# Patient Record
Sex: FEMALE | Employment: FULL TIME | URBAN - METROPOLITAN AREA
[De-identification: names, ages, dates, MRNs, and addresses within clinical notes are randomized per-mention and may not be internally consistent; named-entity substitution may affect disease eponyms.]

---

## 2017-03-03 ENCOUNTER — TELEPHONE (OUTPATIENT)
Dept: FAMILY MEDICINE | Facility: CLINIC | Age: 22
End: 2017-03-03

## 2017-03-03 DIAGNOSIS — F41.9 ANXIETY: ICD-10-CM

## 2017-03-03 DIAGNOSIS — F33.1 MAJOR DEPRESSIVE DISORDER, RECURRENT EPISODE, MODERATE (H): ICD-10-CM

## 2017-03-03 RX ORDER — ESCITALOPRAM OXALATE 20 MG/1
20 TABLET ORAL DAILY
Qty: 30 TABLET | Refills: 0 | Status: SHIPPED | OUTPATIENT
Start: 2017-03-03 | End: 2017-03-05

## 2017-03-03 RX ORDER — ESCITALOPRAM OXALATE 10 MG/1
10 TABLET ORAL DAILY
Qty: 60 TABLET | Refills: 1 | Status: CANCELLED | OUTPATIENT
Start: 2017-03-03

## 2017-03-03 NOTE — TELEPHONE ENCOUNTER
Routing refill request to provider for review/approval because:  PHQ-9 over 4.    Directions need to be updated.    Sravanthi Andersen RN

## 2017-03-03 NOTE — TELEPHONE ENCOUNTER
Escitalopram  Last Written Prescription Date: 12/20/16  Last Fill Quantity: 60, # refills: 1  Last Office Visit with [facility] primary care provider:  12/20/16 Kay Alvarez PA-C, MPAS          Last PHQ-9 score on record=   PHQ-9 SCORE 12/20/2016   Total Score 23         BENSON Steen (R)

## 2017-03-03 NOTE — TELEPHONE ENCOUNTER
Spoke to patient.  Patient is only taking 10 mg per day.  I scheduled her a medication check for next Wednesday March 8th.  Matilda Copeland CMA  March 3, 2017 5:22 PM

## 2017-03-03 NOTE — TELEPHONE ENCOUNTER
Needs to be seen for any additional refills.  Given one month prescription of 20 mg daily.  Make sure that is how much she is taking and assist with scheduling follow up with me

## 2017-03-05 RX ORDER — ESCITALOPRAM OXALATE 10 MG/1
10 TABLET ORAL DAILY
Qty: 30 TABLET | Refills: 1 | Status: SHIPPED | OUTPATIENT
Start: 2017-03-05 | End: 2017-04-25

## 2017-03-05 NOTE — TELEPHONE ENCOUNTER
Prescription for 10 mg sent to pharmacy.  Please call pharmacy and cancel prescription for 20 mg daily

## 2017-04-25 ENCOUNTER — OFFICE VISIT (OUTPATIENT)
Dept: FAMILY MEDICINE | Facility: CLINIC | Age: 22
End: 2017-04-25
Payer: COMMERCIAL

## 2017-04-25 VITALS
DIASTOLIC BLOOD PRESSURE: 80 MMHG | WEIGHT: 187 LBS | SYSTOLIC BLOOD PRESSURE: 130 MMHG | OXYGEN SATURATION: 100 % | HEIGHT: 66 IN | BODY MASS INDEX: 30.05 KG/M2 | HEART RATE: 75 BPM | RESPIRATION RATE: 12 BRPM | TEMPERATURE: 98.1 F

## 2017-04-25 DIAGNOSIS — S46.819D STRAIN OF TRAPEZIUS MUSCLE, UNSPECIFIED LATERALITY, SUBSEQUENT ENCOUNTER: Primary | ICD-10-CM

## 2017-04-25 DIAGNOSIS — F41.9 ANXIETY: ICD-10-CM

## 2017-04-25 DIAGNOSIS — F33.1 MAJOR DEPRESSIVE DISORDER, RECURRENT EPISODE, MODERATE (H): ICD-10-CM

## 2017-04-25 PROCEDURE — 99214 OFFICE O/P EST MOD 30 MIN: CPT | Performed by: PHYSICIAN ASSISTANT

## 2017-04-25 RX ORDER — ESCITALOPRAM OXALATE 10 MG/1
10 TABLET ORAL DAILY
Qty: 90 TABLET | Refills: 1 | Status: SHIPPED | OUTPATIENT
Start: 2017-04-25 | End: 2018-11-29

## 2017-04-25 RX ORDER — MELOXICAM 15 MG/1
15 TABLET ORAL DAILY
Qty: 30 TABLET | Refills: 1 | Status: SHIPPED | OUTPATIENT
Start: 2017-04-25

## 2017-04-25 RX ORDER — METHOCARBAMOL 500 MG/1
1000 TABLET, FILM COATED ORAL 3 TIMES DAILY PRN
Qty: 30 TABLET | Refills: 1 | Status: SHIPPED | OUTPATIENT
Start: 2017-04-25

## 2017-04-25 ASSESSMENT — ANXIETY QUESTIONNAIRES
5. BEING SO RESTLESS THAT IT IS HARD TO SIT STILL: SEVERAL DAYS
IF YOU CHECKED OFF ANY PROBLEMS ON THIS QUESTIONNAIRE, HOW DIFFICULT HAVE THESE PROBLEMS MADE IT FOR YOU TO DO YOUR WORK, TAKE CARE OF THINGS AT HOME, OR GET ALONG WITH OTHER PEOPLE: SOMEWHAT DIFFICULT
6. BECOMING EASILY ANNOYED OR IRRITABLE: SEVERAL DAYS
GAD7 TOTAL SCORE: 6
2. NOT BEING ABLE TO STOP OR CONTROL WORRYING: SEVERAL DAYS
1. FEELING NERVOUS, ANXIOUS, OR ON EDGE: SEVERAL DAYS
7. FEELING AFRAID AS IF SOMETHING AWFUL MIGHT HAPPEN: NOT AT ALL
3. WORRYING TOO MUCH ABOUT DIFFERENT THINGS: SEVERAL DAYS

## 2017-04-25 ASSESSMENT — PATIENT HEALTH QUESTIONNAIRE - PHQ9: 5. POOR APPETITE OR OVEREATING: SEVERAL DAYS

## 2017-04-25 NOTE — PATIENT INSTRUCTIONS
Schedule physical therapy with [facility] ([phone]).  They have several locations around the [facility].     Start robaxin 1000mg three times a day as needed for pain.   If makes sleepy do not drive.   Discontinue ibuprofen.  Start meloxicam daily   Return urgently if any change in symptoms.    Follow up with us if pain not improving over the next 2-4 weeks.

## 2017-04-25 NOTE — NURSING NOTE
"Chief Complaint   Patient presents with     Refill Request       Initial /80 (BP Location: Right arm, Patient Position: Chair, Cuff Size: Adult Regular)  Pulse 75  Temp 98.1  F (36.7  C) (Oral)  Resp 12  Ht 1.664 m (5' 5.5\")  Wt 84.8 kg (187 lb)  SpO2 100%  BMI 30.65 kg/m2 Estimated body mass index is 30.65 kg/(m^2) as calculated from the following:    Height as of this encounter: 1.664 m (5' 5.5\").    Weight as of this encounter: 84.8 kg (187 lb).  Medication Reconciliation: abbi López        "

## 2017-04-25 NOTE — PROGRESS NOTES
SUBJECTIVE:                                                    Sarah Beth Morales is a 21 year old female who presents to clinic today for the following health issues:          Depression and Anxiety Follow-Up    Status since last visit: No change    Other associated symptoms:None    Complicating factors:     Significant life event: No     Current substance abuse: None    PHQ-9 SCORE 12/20/2016 4/25/2017   Total Score 23 9     MIREYA-7 SCORE 12/20/2016 4/25/2017   Total Score 16 6        PHQ-9  English      PHQ-9   Any Language     GAD7       Problem list and histories reviewed & adjusted, as indicated.  Additional history: as documented    Has been seeing chiropractor for shoulder and back pain.  ( at [facility])   3 visits with neck adjustments.  No numbness or tingling.  Neck pain does progress into headache.  Taking ibuprofen three 200mg tablets approximately once  A day without improvement in symptoms.    Rates neck pain approximately 7/10.  Almost intolerable     Also complains of pain in thumbs.  Feel like on fire while holding cell phone or massaging mom's wrists. Dropped cosmetology school due to pain.  Taking on line CMA courses    lexapro seems to help depression and anxiety. Only taking 10 mg.  Didn't like way she felt with 20mg.  Felt jittery.      Sleep depends on the night.  Doesn't sleep hard.      Patient Active Problem List   Diagnosis     Allergic rhinitis     Presence of subdermal contraceptive device     Major depressive disorder, recurrent episode, moderate (H)     Anxiety     Elevated blood pressure reading without diagnosis of hypertension     Non morbid obesity due to excess calories     Past Surgical History:   Procedure Laterality Date     DENTAL SURGERY         Social History   Substance Use Topics     Smoking status: Never Smoker     Smokeless tobacco: Never Used     Alcohol use No     Family History   Problem Relation Age of Onset     DIABETES Mother      Psychotic  "Disorder Mother      depression     DIABETES Father      HEART DISEASE Maternal Grandfather      Lipids Maternal Grandfather      Hypertension Maternal Grandfather      Psychotic Disorder Maternal Grandfather      depression         Current Outpatient Prescriptions   Medication Sig Dispense Refill     methocarbamol (ROBAXIN) 500 MG tablet Take 2 tablets (1,000 mg) by mouth 3 times daily as needed for muscle spasms 30 tablet 1     escitalopram (LEXAPRO) 10 MG tablet Take 1 tablet (10 mg) by mouth daily 90 tablet 1     meloxicam (MOBIC) 15 MG tablet Take 1 tablet (15 mg) by mouth daily 30 tablet 1     etonogestrel (IMPLANON/NEXPLANON) 68 MG IMPL 1 each (68 mg) by Subdermal route once for 1 dose 1 each 0       Reviewed and updated as needed this visit by clinical staff  Tobacco  Allergies  Meds  Med Hx  Surg Hx  Fam Hx  Soc Hx      Reviewed and updated as needed this visit by Provider         ROS:  Constitutional, HEENT, cardiovascular, pulmonary, gi and gu systems are negative, except as otherwise noted.    OBJECTIVE:                                                    /80 (BP Location: Right arm, Patient Position: Chair, Cuff Size: Adult Regular)  Pulse 75  Temp 98.1  F (36.7  C) (Oral)  Resp 12  Ht 1.664 m (5' 5.5\")  Wt 84.8 kg (187 lb)  SpO2 100%  BMI 30.65 kg/m2  Body mass index is 30.65 kg/(m^2).  GENERAL: healthy, alert and no distress  NECK: no adenopathy, no asymmetry, masses, or scars and thyroid normal to palpation  RESP: lungs clear to auscultation - no rales, rhonchi or wheezes  CV: regular rate and rhythm, normal S1 S2, no S3 or S4, no murmur, click or rub, no peripheral edema and peripheral pulses strong  ABDOMEN: soft, nontender, no hepatosplenomegaly, no masses and bowel sounds normal  MS: limited range of motion of neck due to pain- some loss of range of motion with right rotation  Tender trapezius muscles. No tenderness of cervical spine  Normal sensation distal upper extremities.  " Strength +5/5 hand  biceps and triceps reflexes normal.     Diagnostic Test Results:  none      ASSESSMENT/PLAN:                                                            1. Strain of trapezius muscle, unspecified laterality, subsequent encounter  Follow up with physical therapy and trial of robaxin and mobic.  Advised not to take any ibuprofen  - methocarbamol (ROBAXIN) 500 MG tablet; Take 2 tablets (1,000 mg) by mouth 3 times daily as needed for muscle spasms  Dispense: 30 tablet; Refill: 1  - [facility] PT, HAND, AND CHIROPRACTIC REFERRAL  - meloxicam (MOBIC) 15 MG tablet; Take 1 tablet (15 mg) by mouth daily  Dispense: 30 tablet; Refill: 1    2. Major depressive disorder, recurrent episode, moderate (H)  Continue lexapro-declines dose adjustment at this time even though still some symptoms   - escitalopram (LEXAPRO) 10 MG tablet; Take 1 tablet (10 mg) by mouth daily  Dispense: 90 tablet; Refill: 1    3. Anxiety  As above   - escitalopram (LEXAPRO) 10 MG tablet; Take 1 tablet (10 mg) by mouth daily  Dispense: 90 tablet; Refill: 1    Patient Instructions   Schedule physical therapy with [facility] ([phone]).  They have several locations around the [facility].     Start robaxin 1000mg three times a day as needed for pain.   If makes sleepy do not drive.   Discontinue ibuprofen.  Start meloxicam daily   Return urgently if any change in symptoms.    Follow up with us if pain not improving over the next 2-4 weeks.       Kay Alvarez PA-C  [facility]

## 2017-04-25 NOTE — MR AVS SNAPSHOT
After Visit Summary   4/25/2017    Sarah Beth Morales    MRN: [id number]           Patient Information     Date Of Birth          1995        Visit Information        Provider Department      4/25/2017 2:20 PM Kay Alvarez PA-C [facility]        Today's Diagnoses     Strain of trapezius muscle, unspecified laterality, subsequent encounter    -  1    Major depressive disorder, recurrent episode, moderate (H)        Anxiety          Care Instructions    Schedule physical therapy with [facility] ([phone]).  They have several locations around the [facility].     Start robaxin 1000mg three times a day as needed for pain.   If makes sleepy do not drive.   Discontinue ibuprofen.  Start meloxicam daily   Return urgently if any change in symptoms.    Follow up with us if pain not improving over the next 2-4 weeks.        Follow-ups after your visit        Additional Services     [facility] PT, HAND, AND CHIROPRACTIC REFERRAL       === This order will print in the [facility] Scheduling  Office ===    Physical therapy, hand therapy and chiropractic care are available through:    [place] [facility]    Call one easy number to schedule at any of the above locations:  [phone].    Your provider has referred you to Physical Therapy at [facility] or [facility]    Indication/Reason for Referral: neck pain and upper back pain   Onset of Illness:  1 month   Therapy Orders:  Evaluate and Treat  Special Programs:  None  Special Request:  None    Additional Comments for the therapist or chiropractor:      Please be aware that coverage of these services is subject to the terms and limitations of your health insurance plan.  Call member services at your health plan with any benefit or coverage questions.      Please bring the following to your appointment:    >>   Your personal calendar for scheduling future appointments  >>    "Comfortable clothing                  Who to contact     If you have questions or need follow up information about today's clinic visit or your schedule please contact [facility] directly at [phone].  Normal or non-critical lab and imaging results will be communicated to you by MyChart, letter or phone within 4 business days after the clinic has received the results. If you do not hear from us within 7 days, please contact the clinic through MyChart or phone. If you have a critical or abnormal lab result, we will notify you by phone as soon as possible.  Submit refill requests through [employer] or call your pharmacy and they will forward the refill request to us. Please allow 3 business days for your refill to be completed.          Additional Information About Your Visit        [employer] Information     [employer] gives you secure access to your electronic health record. If you see a primary care provider, you can also send messages to your care team and make appointments. If you have questions, please call your primary care clinic.  If you do not have a primary care provider, please call [phone] and they will assist you.        Care EveryWhere ID     This is your Care EveryWhere ID. This could be used by other organizations to access your [place] medical records  [id number]        Your Vitals Were     Pulse Temperature Respirations Height Pulse Oximetry BMI (Body Mass Index)    75 98.1  F (36.7  C) (Oral) 12 1.664 m (5' 5.5\") 100% 30.65 kg/m2       Blood Pressure from Last 3 Encounters:   04/25/17 130/80   12/20/16 (!) 120/100   04/15/15 110/80    Weight from Last 3 Encounters:   04/25/17 84.8 kg (187 lb)   12/20/16 85.2 kg (187 lb 14.4 oz)   04/15/15 70.8 kg (156 lb 1.6 oz) (87 %)*     * Growth percentiles are based on CDC 2-20 Years data.              We Performed the Following     [facility] PT, HAND, AND CHIROPRACTIC REFERRAL          Today's Medication Changes          These changes are " accurate as of: 4/25/17  2:48 PM.  If you have any questions, ask your nurse or doctor.               Start taking these medicines.        Dose/Directions    meloxicam 15 MG tablet   Commonly known as:  MOBIC   Used for:  Strain of trapezius muscle, unspecified laterality, subsequent encounter   Started by:  Kay Alvarez PA-C        Dose:  15 mg   Take 1 tablet (15 mg) by mouth daily   Quantity:  30 tablet   Refills:  1       methocarbamol 500 MG tablet   Commonly known as:  ROBAXIN   Used for:  Strain of trapezius muscle, unspecified laterality, subsequent encounter   Started by:  Kay Alvarez PA-C        Dose:  1000 mg   Take 2 tablets (1,000 mg) by mouth 3 times daily as needed for muscle spasms   Quantity:  30 tablet   Refills:  1         These medicines have changed or have updated prescriptions.        Dose/Directions    escitalopram 10 MG tablet   Commonly known as:  LEXAPRO   This may have changed:  additional instructions   Used for:  Major depressive disorder, recurrent episode, moderate (H), Anxiety   Changed by:  Kay Alvarez PA-C        Dose:  10 mg   Take 1 tablet (10 mg) by mouth daily   Quantity:  90 tablet   Refills:  1            Where to get your medicines      These medications were sent to [id number] [place], [address] AT [address] [place] [address]     Phone:  [phone]     escitalopram 10 MG tablet    meloxicam 15 MG tablet    methocarbamol 500 MG tablet                Primary Care Provider Office Phone # Fax #    Kay Alvarez PA-C [phone]       [address]  [facility] [address]        Thank you!     Thank you for choosing [facility]  for your care. Our goal is always to provide you with excellent care. Hearing back from our patients is one way we can continue to improve our services. Please take a few minutes to  complete the written survey that you may receive in the mail after your visit with us. Thank you!             Your Updated Medication List - Protect others around you: Learn how to safely use, store and throw away your medicines at www.disposemymeds.org.          This list is accurate as of: 4/25/17  2:48 PM.  Always use your most recent med list.                   Brand Name Dispense Instructions for use    escitalopram 10 MG tablet    LEXAPRO    90 tablet    Take 1 tablet (10 mg) by mouth daily       etonogestrel 68 MG Impl    IMPLANON/NEXPLANON    1 each    1 each (68 mg) by Subdermal route once for 1 dose       meloxicam 15 MG tablet    MOBIC    30 tablet    Take 1 tablet (15 mg) by mouth daily       methocarbamol 500 MG tablet    ROBAXIN    30 tablet    Take 2 tablets (1,000 mg) by mouth 3 times daily as needed for muscle spasms

## 2017-04-26 ASSESSMENT — PATIENT HEALTH QUESTIONNAIRE - PHQ9: SUM OF ALL RESPONSES TO PHQ QUESTIONS 1-9: 9

## 2017-04-26 ASSESSMENT — ANXIETY QUESTIONNAIRES: GAD7 TOTAL SCORE: 6

## 2017-05-24 ENCOUNTER — THERAPY VISIT (OUTPATIENT)
Dept: PHYSICAL THERAPY | Facility: CLINIC | Age: 22
End: 2017-05-24
Payer: COMMERCIAL

## 2017-05-24 DIAGNOSIS — M54.2 NECK PAIN: Primary | ICD-10-CM

## 2017-05-24 PROCEDURE — 97161 PT EVAL LOW COMPLEX 20 MIN: CPT | Mod: GP | Performed by: PHYSICAL THERAPIST

## 2017-05-24 PROCEDURE — 97110 THERAPEUTIC EXERCISES: CPT | Mod: GP | Performed by: PHYSICAL THERAPIST

## 2017-05-24 PROCEDURE — 97140 MANUAL THERAPY 1/> REGIONS: CPT | Mod: GP | Performed by: PHYSICAL THERAPIST

## 2017-05-24 NOTE — Clinical Note
Please see the evaluation report completed in PT today.  Thank you for referring Sarah Beth to us! Cordially,  STEPHEN Quiroz, MPT

## 2017-05-24 NOTE — PROGRESS NOTES
[facility] Initial Evaluation      Subjective:    Patient is a 21 year old female presenting with rehab cervical spine hpi. The history is provided by the patient.   Sarah Beth Morales is a 21 year old female with a cervical spine and thoracic spine condition.  Condition occurred with:  Insidious onset.  Condition occurred: at home.  This is a new condition  Pt's neck and upper back started about a yr ago; it started when she started sleeping w/a foster brother who needed to sleep with her, which caused her to sleep w/her head tilted. She is trying to wean him from sleeping with her, but it is difficult. She has consulted w/her PCP and the chiropractor, PCP on 4/25/17.  Her last chiropractic treatment was on 5/19/17..    Patient reports pain:  Cervical right side and cervical left side (B upper traps).  Radiates to:  Head.  Pain is described as aching and is constant and reported as 7/10 (10/10 at worst).  Associated symptoms:  Headache, visual disturbances, dizziness and loss of motion/stiffness (nauseous). Pain is the same all the time.  Symptoms are exacerbated by carrying, lifting, looking up or down and certain positions (lifting >50#; gets nauseous w/looking down; stress) and relieved by ice, other, NSAID's and rest (antidepressants).      Previous treatment includes chiropractic (but not anymore for her neck, just her shoulders and LB).  There was no (adjustments to her neck made her dizzy and nauseous) improvement following previous treatment.  General health as reported by patient is good.  Pertinent medical history includes:  Depression and migraines.  Medical allergies: yes (listed in Epic, pt also notes possibility to latex).  Other surgeries include:  No.  Current medications:  Anti-inflammatory, anti-depressants and muscle relaxants.  Current occupation is PCA; student (online).  Patient is working in normal job without restrictions.  Primary job tasks include:  Lifting and repetitive  "tasks (2-3 hrs on the computer per day for class work).    Barriers include:  None as reported by the patient.    Red flags:  None as reported by the patient.                        Objective:    Standing Alignment:    Cervical/Thoracic:  Forward head (moderate to significant)  Shoulder/UE:  Rounded shoulders (moderate)                                  Cervical/Thoracic Evaluation    AROM:  AROM Cervical:    Flexion:            100% of NL  Extension:       Full  Rotation:         Left: full w/\"hot\" sensation     Right: full, \"hot\"  Side Bend:      Left: full, pain     Right:  Full    Strength: weak scapular, upper back mm as demonstrated w/pt's quick return to poor posture after correcting it  Headaches: none  Cervical Myotomes:  not assessed                  DTR's:  not assessed            Cervical Palpation:    Tenderness present at Left:    Scalenes; Upper Trap; Levator and Suboccipitals  Tenderness not present at Left:   Erector Spinae  Tenderness present at Right:    Scalenes; Upper Trap; Levator; Erector Spinae and Suboccipitals      Spinal Segmental Conclusions:  NL vertebral joint mobility at all cervical levels, no pain with either anterior or side glides.                                                  General     ROS    Assessment/Plan:      Patient is a 21 year old female with cervical complaints.    Patient has the following significant findings with corresponding treatment plan.                Diagnosis 1:  B upper trap strain  Pain -  hot/cold therapy, US, electric stimulation, self management, education and home program  Decreased ROM/flexibility - manual therapy and therapeutic exercise  Decreased strength - therapeutic exercise and therapeutic activities  Impaired muscle performance - neuro re-education  Decreased function - therapeutic activities  Impaired posture - neuro re-education and therapeutic activities    Therapy Evaluation Codes:   1) History comprised of:   Personal factors that impact " the plan of care:      Past/current experiences.    Comorbidity factors that impact the plan of care are:      Dizziness.     Medications impacting care: Muscle relaxant.  2) Examination of Body Systems comprised of:   Body structures and functions that impact the plan of care:      Cervical spine.   Activity limitations that impact the plan of care are:      Lifting, Reading/Computer work and Sleeping.  3) Clinical presentation characteristics are:   Stable/Uncomplicated.  4) Decision-Making    Low complexity using standardized patient assessment instrument and/or measureable assessment of functional outcome.  Cumulative Therapy Evaluation is: Low complexity.    Previous and current functional limitations:  (See Goal Flow Sheet for this information)    Short term and Long term goals: (See Goal Flow Sheet for this information)     Communication ability:  Patient appears to be able to clearly communicate and understand verbal and written communication and follow directions correctly.  Treatment Explanation - The following has been discussed with the patient:   RX ordered/plan of care  Anticipated outcomes  Possible risks and side effects  This patient would benefit from PT intervention to resume normal activities.   Rehab potential is good.    Frequency:  1 X week, once daily  Duration:  for 6 weeks  Discharge Plan:  Achieve all LTG.  Independent in home treatment program.  Reach maximal therapeutic benefit.    Please refer to the daily flowsheet for treatment today, total treatment time and time spent performing 1:1 timed codes.

## 2017-05-24 NOTE — MR AVS SNAPSHOT
After Visit Summary   5/24/2017    Sarah Beth Morales    MRN: [id number]           Patient Information     Date Of Birth          1995        Visit Information        Provider Department      5/24/2017 2:30 PM Sharmaine Quiroz PT [facility]        Today's Diagnoses     Neck pain    -  1       Follow-ups after your visit        Your next 10 appointments already scheduled     May 31, 2017  2:30 PM CDT   [facility] Spine with Sharmaine Quiroz PT   [facility] ([facility] [place]  )    [address]  [facility] [address]   [phone]              Who to contact     If you have questions or need follow up information about today's clinic visit or your schedule please contact [facility] directly at [phone].  Normal or non-critical lab and imaging results will be communicated to you by MyChart, letter or phone within 4 business days after the clinic has received the results. If you do not hear from us within 7 days, please contact the clinic through [employer] or phone. If you have a critical or abnormal lab result, we will notify you by phone as soon as possible.  Submit refill requests through [employer] or call your pharmacy and they will forward the refill request to us. Please allow 3 business days for your refill to be completed.          Additional Information About Your Visit        MyChart Information     [employer] gives you secure access to your electronic health record. If you see a primary care provider, you can also send messages to your care team and make appointments. If you have questions, please call your primary care clinic.  If you do not have a primary care provider, please call [phone] and they will assist you.        Care EveryWhere ID     This is your Care EveryWhere ID. This could be used by other organizations to access your [place] medical records  [id number]          Blood Pressure from Last 3 Encounters:   04/25/17 130/80   12/20/16 (!) 120/100   04/15/15 110/80    Weight from Last 3 Encounters:   04/25/17 84.8 kg (187 lb)   12/20/16 85.2 kg (187 lb 14.4 oz)   04/15/15 70.8 kg (156 lb 1.6 oz) (87 %)*     * Growth percentiles are based on [facility] 2-20 Years data.              We Performed the Following     HC PT EVAL, LOW COMPLEXITY     [facility] INITIAL EVAL REPORT     MANUAL THER TECH,1+REGIONS,EA 15 MIN     THERAPEUTIC EXERCISES        Primary Care Provider Office Phone # Fax #    Kay Alvarez PA-C [phone]       [address] [address]        Thank you!     Thank you for choosing [place] [facility]  for your care. Our goal is always to provide you with excellent care. Hearing back from our patients is one way we can continue to improve our services. Please take a few minutes to complete the written survey that you may receive in the mail after your visit with us. Thank you!             Your Updated Medication List - Protect others around you: Learn how to safely use, store and throw away your medicines at www.disposemymeds.org.          This list is accurate as of: 5/24/17  3:34 PM.  Always use your most recent med list.                   Brand Name Dispense Instructions for use    escitalopram 10 MG tablet    LEXAPRO    90 tablet    Take 1 tablet (10 mg) by mouth daily       etonogestrel 68 MG Impl    IMPLANON/NEXPLANON    1 each    1 each (68 mg) by Subdermal route once for 1 dose       meloxicam 15 MG tablet    MOBIC    30 tablet    Take 1 tablet (15 mg) by mouth daily       methocarbamol 500 MG tablet    ROBAXIN    30 tablet    Take 2 tablets (1,000 mg) by mouth 3 times daily as needed for muscle spasms

## 2017-07-11 ENCOUNTER — OFFICE VISIT (OUTPATIENT)
Dept: FAMILY MEDICINE | Facility: CLINIC | Age: 22
End: 2017-07-11
Payer: COMMERCIAL

## 2017-07-11 VITALS
BODY MASS INDEX: 29.73 KG/M2 | RESPIRATION RATE: 16 BRPM | TEMPERATURE: 98.4 F | HEART RATE: 68 BPM | WEIGHT: 185 LBS | DIASTOLIC BLOOD PRESSURE: 74 MMHG | SYSTOLIC BLOOD PRESSURE: 108 MMHG | OXYGEN SATURATION: 98 % | HEIGHT: 66 IN

## 2017-07-11 DIAGNOSIS — B37.31 CANDIDIASIS OF VULVA AND VAGINA: Primary | ICD-10-CM

## 2017-07-11 DIAGNOSIS — N89.8 VAGINAL DISCHARGE: ICD-10-CM

## 2017-07-11 DIAGNOSIS — Z23 NEED FOR HPV VACCINATION: ICD-10-CM

## 2017-07-11 DIAGNOSIS — R30.0 DYSURIA: ICD-10-CM

## 2017-07-11 LAB
ALBUMIN UR-MCNC: NEGATIVE MG/DL
APPEARANCE UR: CLEAR
BACTERIA #/AREA URNS HPF: ABNORMAL /HPF
BILIRUB UR QL STRIP: NEGATIVE
COLOR UR AUTO: YELLOW
GLUCOSE UR STRIP-MCNC: NEGATIVE MG/DL
HGB UR QL STRIP: ABNORMAL
KETONES UR STRIP-MCNC: NEGATIVE MG/DL
LEUKOCYTE ESTERASE UR QL STRIP: NEGATIVE
MICRO REPORT STATUS: NORMAL
NITRATE UR QL: NEGATIVE
PH UR STRIP: 6.5 PH (ref 5–7)
RBC #/AREA URNS AUTO: ABNORMAL /HPF (ref 0–2)
SP GR UR STRIP: 1.01 (ref 1–1.03)
SPECIMEN SOURCE: NORMAL
URN SPEC COLLECT METH UR: ABNORMAL
UROBILINOGEN UR STRIP-ACNC: 1 EU/DL (ref 0.2–1)
WBC #/AREA URNS AUTO: ABNORMAL /HPF (ref 0–2)
WET PREP SPEC: NORMAL

## 2017-07-11 PROCEDURE — 87210 SMEAR WET MOUNT SALINE/INK: CPT | Performed by: PHYSICIAN ASSISTANT

## 2017-07-11 PROCEDURE — 90651 9VHPV VACCINE 2/3 DOSE IM: CPT | Performed by: PHYSICIAN ASSISTANT

## 2017-07-11 PROCEDURE — 90471 IMMUNIZATION ADMIN: CPT | Performed by: PHYSICIAN ASSISTANT

## 2017-07-11 PROCEDURE — 87491 CHLMYD TRACH DNA AMP PROBE: CPT | Performed by: PHYSICIAN ASSISTANT

## 2017-07-11 PROCEDURE — 87591 N.GONORRHOEAE DNA AMP PROB: CPT | Performed by: PHYSICIAN ASSISTANT

## 2017-07-11 PROCEDURE — 81001 URINALYSIS AUTO W/SCOPE: CPT | Performed by: PHYSICIAN ASSISTANT

## 2017-07-11 PROCEDURE — 99214 OFFICE O/P EST MOD 30 MIN: CPT | Mod: 25 | Performed by: PHYSICIAN ASSISTANT

## 2017-07-11 RX ORDER — FLUCONAZOLE 150 MG/1
150 TABLET ORAL ONCE
Qty: 1 TABLET | Refills: 2 | Status: SHIPPED | OUTPATIENT
Start: 2017-07-11 | End: 2017-07-11

## 2017-07-11 ASSESSMENT — ANXIETY QUESTIONNAIRES
GAD7 TOTAL SCORE: 4
1. FEELING NERVOUS, ANXIOUS, OR ON EDGE: NOT AT ALL
7. FEELING AFRAID AS IF SOMETHING AWFUL MIGHT HAPPEN: NOT AT ALL
2. NOT BEING ABLE TO STOP OR CONTROL WORRYING: SEVERAL DAYS
5. BEING SO RESTLESS THAT IT IS HARD TO SIT STILL: NOT AT ALL
6. BECOMING EASILY ANNOYED OR IRRITABLE: MORE THAN HALF THE DAYS
3. WORRYING TOO MUCH ABOUT DIFFERENT THINGS: SEVERAL DAYS
IF YOU CHECKED OFF ANY PROBLEMS ON THIS QUESTIONNAIRE, HOW DIFFICULT HAVE THESE PROBLEMS MADE IT FOR YOU TO DO YOUR WORK, TAKE CARE OF THINGS AT HOME, OR GET ALONG WITH OTHER PEOPLE: SOMEWHAT DIFFICULT

## 2017-07-11 ASSESSMENT — PAIN SCALES - GENERAL: PAINLEVEL: NO PAIN (0)

## 2017-07-11 ASSESSMENT — PATIENT HEALTH QUESTIONNAIRE - PHQ9: 5. POOR APPETITE OR OVEREATING: NOT AT ALL

## 2017-07-11 NOTE — MR AVS SNAPSHOT
After Visit Summary   7/11/2017    Sarah Beth Morales    MRN: [id number]           Patient Information     Date Of Birth          1995        Visit Information        Provider Department      7/11/2017 1:20 PM Kay Alvarez PA-C [facility]        Today's Diagnoses     Vaginal discharge    -  1    Dysuria        Need for HPV vaccination        Candidiasis of vulva and vagina          Care Instructions    Take diflucan 150 mg for one dose.  Refills have been sent to pharmacy  Return urgently if any change in symptoms like increasing pain, increasing discharge, abdominal pain, nausea or vomiting or other change in symptoms.           Follow-ups after your visit        Who to contact     If you have questions or need follow up information about today's clinic visit or your schedule please contact [facility] directly at [phone].  Normal or non-critical lab and imaging results will be communicated to you by MyChart, letter or phone within 4 business days after the clinic has received the results. If you do not hear from us within 7 days, please contact the clinic through MyChart or phone. If you have a critical or abnormal lab result, we will notify you by phone as soon as possible.  Submit refill requests through [employer] or call your pharmacy and they will forward the refill request to us. Please allow 3 business days for your refill to be completed.          Additional Information About Your Visit        MyChart Information     [employer] gives you secure access to your electronic health record. If you see a primary care provider, you can also send messages to your care team and make appointments. If you have questions, please call your primary care clinic.  If you do not have a primary care provider, please call [phone] and they will assist you.        Care EveryWhere ID     This is your Care EveryWhere ID. This could be used by other organizations to access  "your [facility] records  [id number]        Your Vitals Were     Pulse Temperature Respirations Height Pulse Oximetry Breastfeeding?    68 98.4  F (36.9  C) (Oral) 16 1.664 m (5' 5.5\") 98% No    BMI (Body Mass Index)                   30.32 kg/m2            Blood Pressure from Last 3 Encounters:   07/11/17 108/74   04/25/17 130/80   12/20/16 (!) 120/100    Weight from Last 3 Encounters:   07/11/17 83.9 kg (185 lb)   04/25/17 84.8 kg (187 lb)   12/20/16 85.2 kg (187 lb 14.4 oz)              We Performed the Following     *UA reflex to Microscopic     CHLAMYDIA TRACHOMATIS PCR     DEPRESSION ACTION PLAN (DAP)     HUMAN PAPILLOMAVIRUS VACCINE     NEISSERIA GONORRHOEA PCR     Urine Microscopic     Wet prep          Today's Medication Changes          These changes are accurate as of: 7/11/17  2:04 PM.  If you have any questions, ask your nurse or doctor.               Start taking these medicines.        Dose/Directions    fluconazole 150 MG tablet   Commonly known as:  DIFLUCAN   Used for:  Candidiasis of vulva and vagina   Started by:  Kay Alvarez PA-C        Dose:  150 mg   Take 1 tablet (150 mg) by mouth once for 1 dose   Quantity:  1 tablet   Refills:  2            Where to get your medicines      These medications were sent to [facility] [id number] [place] [address] AT [address], [facility] [address]     Phone:  [phone]     fluconazole 150 MG tablet                Primary Care Provider Office Phone # Fax #    Kay Alvarez PA-C [phone]       [facility] [address]  [facility] [address]        Equal Access to Services     [facility]: Hadii aad ku hadasho Soomaali, waaxda luqadaha, qaybta kaalmada adeegyada, miley reidn malou colón. So [facility] [phone].    ATENCIÓN: Si habla español, tiene a langley disposición servicios gratuitos de asistencia lingüística. Llame al " [phone].    We comply with applicable federal civil rights laws and [place] laws. We do not discriminate on the basis of race, color, national origin, age, disability sex, sexual orientation or gender identity.            Thank you!     Thank you for choosing [facility]  for your care. Our goal is always to provide you with excellent care. Hearing back from our patients is one way we can continue to improve our services. Please take a few minutes to complete the written survey that you may receive in the mail after your visit with us. Thank you!             Your Updated Medication List - Protect others around you: Learn how to safely use, store and throw away your medicines at www.disposemymeds.org.          This list is accurate as of: 7/11/17  2:04 PM.  Always use your most recent med list.                   Brand Name Dispense Instructions for use Diagnosis    escitalopram 10 MG tablet    LEXAPRO    90 tablet    Take 1 tablet (10 mg) by mouth daily    Major depressive disorder, recurrent episode, moderate (H), Anxiety       etonogestrel 68 MG Impl    IMPLANON/NEXPLANON    1 each    1 each (68 mg) by Subdermal route once for 1 dose    Presence of subdermal contraceptive device, Insertion of implantable subdermal contraceptive       fluconazole 150 MG tablet    DIFLUCAN    1 tablet    Take 1 tablet (150 mg) by mouth once for 1 dose    Candidiasis of vulva and vagina       meloxicam 15 MG tablet    MOBIC    30 tablet    Take 1 tablet (15 mg) by mouth daily    Strain of trapezius muscle, unspecified laterality, subsequent encounter       methocarbamol 500 MG tablet    ROBAXIN    30 tablet    Take 2 tablets (1,000 mg) by mouth 3 times daily as needed for muscle spasms    Strain of trapezius muscle, unspecified laterality, subsequent encounter

## 2017-07-11 NOTE — PROGRESS NOTES
"  SUBJECTIVE:                                                    Sarah Beth Morales is a 21 year old female who presents to clinic today for the following health issues:    Vaginal Symptoms  Onset: 1 1/2 weeks    Description:  Vaginal Discharge: spotting   Itching (Pruritis): YES  Burning sensation:  YES  Odor: YES    Accompanying Signs & Symptoms:  Pain with Urination: occasional   Abdominal Pain: YES  Fever: no     History:   Sexually active: YES  New Partner: no   Possibility of Pregnancy: don't know    Precipitating factors:   Recent Antibiotic Use: YES- clindamyacin    Alleviating factors:  none    Therapies Tried and outcome: monistat    Reports history of vaginal discharge and itching for approximately 1 1/2 weeks.  Is menstruating with spotting.  Normal timing.  Usually gets vaginal bleeding every 3 months with nexplanon.  Has some cramping.  No fever, sweats, chills. Recently treated with clindamycin after wisdom teeth extraction.  Reports frequently gets yeast vaginitis or BACTERIAL VAGINOSIS.  Reports symptoms usual for her yeast infection.  Boyfriend isn't circumcised and frequently gets bacterial vaginosis or yeast infection after intercourse with him.  Also reports that \"soaps can throw me off and end up with yeast infection.\"  Doesn't want to come into clinic every time has yeast infection.   History of gonorrhea and chlamydia 2-3 yrs ago and chlamydia approximately 6 months ago.    Has tried monistat without improvement in symptoms.  Has noted some dysuria last week.  No urinating frequently     Problem list and histories reviewed & adjusted, as indicated.  Additional history: as documented    Patient Active Problem List   Diagnosis     Allergic rhinitis     Presence of subdermal contraceptive device     Major depressive disorder, recurrent episode, moderate (H)     Anxiety     Elevated blood pressure reading without diagnosis of hypertension     Non morbid obesity due to excess calories     Neck pain     " "Past Surgical History:   Procedure Laterality Date     DENTAL SURGERY         Social History   Substance Use Topics     Smoking status: Never Smoker     Smokeless tobacco: Never Used     Alcohol use No     Family History   Problem Relation Age of Onset     DIABETES Mother      Psychotic Disorder Mother      depression     DIABETES Father      HEART DISEASE Maternal Grandfather      Lipids Maternal Grandfather      Hypertension Maternal Grandfather      Psychotic Disorder Maternal Grandfather      depression         Current Outpatient Prescriptions   Medication Sig Dispense Refill            methocarbamol (ROBAXIN) 500 MG tablet Take 2 tablets (1,000 mg) by mouth 3 times daily as needed for muscle spasms 30 tablet 1     escitalopram (LEXAPRO) 10 MG tablet Take 1 tablet (10 mg) by mouth daily 90 tablet 1     meloxicam (MOBIC) 15 MG tablet Take 1 tablet (15 mg) by mouth daily 30 tablet 1     etonogestrel (IMPLANON/NEXPLANON) 68 MG IMPL 1 each (68 mg) by Subdermal route once for 1 dose 1 each 0       Reviewed and updated as needed this visit by clinical staff       Reviewed and updated as needed this visit by Provider         ROS:  Constitutional, HEENT, cardiovascular, pulmonary, gi and gu systems are negative, except as otherwise noted.    OBJECTIVE:     /74 (BP Location: Right arm, Patient Position: Chair, Cuff Size: Adult Large)  Pulse 68  Temp 98.4  F (36.9  C) (Oral)  Resp 16  Ht 1.664 m (5' 5.5\")  Wt 83.9 kg (185 lb)  SpO2 98%  Breastfeeding? No  BMI 30.32 kg/m2  Body mass index is 30.32 kg/(m^2).  GENERAL: healthy, alert and no distress  NECK: no adenopathy, no asymmetry, masses, or scars and thyroid normal to palpation  RESP: lungs clear to auscultation - no rales, rhonchi or wheezes  CV: regular rate and rhythm, normal S1 S2, no S3 or S4, no murmur, click or rub, no peripheral edema and peripheral pulses strong  ABDOMEN: soft, nontender, no hepatosplenomegaly, no masses and bowel sounds " normal   (female): normal female external genitalia, normal urethral meatus , vaginal mucosa pink, moist, well rugated, vaginal discharge - moderate, bloody, thick and odorless and normal cervix, adnexae, and uterus without masses.  MS: no gross musculoskeletal defects noted, no edema    Diagnostic Test Results:  Results for orders placed or performed in visit on 07/11/17   *UA reflex to Microscopic   Result Value Ref Range    Color Urine Yellow     Appearance Urine Clear     Glucose Urine Negative NEG mg/dL    Bilirubin Urine Negative NEG    Ketones Urine Negative NEG mg/dL    Specific Gravity Urine 1.015 1.003 - 1.035    Blood Urine Small (A) NEG    pH Urine 6.5 5.0 - 7.0 pH    Protein Albumin Urine Negative NEG mg/dL    Urobilinogen Urine 1.0 0.2 - 1.0 EU/dL    Nitrite Urine Negative NEG    Leukocyte Esterase Urine Negative NEG    Source Midstream Urine    Urine Microscopic   Result Value Ref Range    WBC Urine O - 2 0 - 2 /HPF    RBC Urine O - 2 0 - 2 /HPF    Bacteria Urine Moderate (A) NEG /HPF   Wet prep   Result Value Ref Range    Specimen Description Vagina     Wet Prep       No clue cells seen  No yeast seen  No Trichomonas seen      Micro Report Status FINAL 07/11/2017        ASSESSMENT/PLAN:             1. Candidiasis of vulva and vagina  Given thickness of discharge and symptoms and history of recurrent yeast vaginitis will treat with diflucan.  No evidence of BACTERIAL VAGINOSIS.   - fluconazole (DIFLUCAN) 150 MG tablet; Take 1 tablet (150 mg) by mouth once for 1 dose  Dispense: 1 tablet; Refill: 2    2. Vaginal discharge  GC and chlamydia testing pending  - NEISSERIA GONORRHOEA PCR  - CHLAMYDIA TRACHOMATIS PCR  - Wet prep  - Urine Microscopic    3. Dysuria  Normal urinalysis except for blood which we should expect given vaginal bleeding  - *UA reflex to Microscopic    4. Need for HPV vaccination    - VACCINE ADMINISTRATION, INITIAL  - HUMAN PAPILLOMA VIRUS (GARDASIL 9) VACCINE        Kay PERRY  NII Alvarez  [facility]

## 2017-07-11 NOTE — NURSING NOTE
Screening Questionnaire for Adult Immunization    Are you sick today?   No   Do you have allergies to medications, food, a vaccine component or latex?   Yes, known by provider   Have you ever had a serious reaction after receiving a vaccination?   No   Do you have a long-term health problem with heart disease, lung disease, asthma, kidney disease, metabolic disease (e.g. diabetes), anemia, or other blood disorder?   No   Do you have cancer, leukemia, HIV/AIDS, or any other immune system problem?   No   In the past 3 months, have you taken medications that affect  your immune system, such as prednisone, other steroids, or anticancer drugs; drugs for the treatment of rheumatoid arthritis, Crohn s disease, or psoriasis; or have you had radiation treatments?   No   Have you had a seizure, or a brain or other nervous system problem?   No   During the past year, have you received a transfusion of blood or blood     products, or been given immune (gamma) globulin or antiviral drug?   No   For women: Are you pregnant or is there a chance you could become        pregnant during the next month?   No   Have you received any vaccinations in the past 4 weeks?   No     Immunization questionnaire was positive for at least one answer.  Notified known by provider.      MNVFC doesn't apply on this patient       Screening performed by Bridget Ritchie on 7/11/2017 at 2:12 PM.

## 2017-07-11 NOTE — PATIENT INSTRUCTIONS
Take diflucan 150 mg for one dose.  Refills have been sent to pharmacy  Return urgently if any change in symptoms like increasing pain, increasing discharge, abdominal pain, nausea or vomiting or other change in symptoms.

## 2017-07-11 NOTE — LETTER
My Depression Action Plan  Name: Sarah Beth Morales   Date of Birth 1995  Date: 7/11/2017    My doctor: Kay Alvarez   My clinic: [address] [address] [phone]          GREEN    ZONE   Good Control    What it looks like:     Things are going generally well. You have normal up s and down s. You may even feel depressed from time to time, but bad moods usually last less than a day.   What you need to do:  1. Continue to care for yourself (see self care plan)  2. Check your depression survival kit and update it as needed  3. Follow your physician s recommendations including any medication.  4. Do not stop taking medication unless you consult with your physician first.           YELLOW         ZONE Getting Worse    What it looks like:     Depression is starting to interfere with your life.     It may be hard to get out of bed; you may be starting to isolate yourself from others.    Symptoms of depression are starting to last most all day and this has happened for several days.     You may have suicidal thoughts but they are not constant.   What you need to do:     1. Call your care team, your response to treatment will improve if you keep your care team informed of your progress. Yellow periods are signs an adjustment may need to be made.     2. Continue your self-care, even if you have to fake it!    3. Talk to someone in your support network    4. Open up your depression survival kit           RED    ZONE Medical Alert - Get Help    What it looks like:     Depression is seriously interfering with your life.     You may experience these or other symptoms: You can t get out of bed most days, can t work or engage in other necessary activities, you have trouble taking care of basic hygiene, or basic responsibilities, thoughts of suicide or death that will not go away, self-injurious behavior.     What you need to do:  1. Call your care  team and request a same-day appointment. If they are not available (weekends or after hours) call your local crisis line, emergency room or 911.      Electronically signed : July 11, 2017    Depression Self Care Plan / Survival Kit    Self-Care for Depression  Here s the deal. Your body and mind are really not as separate as most people think.  What you do and think affects how you feel and how you feel influences what you do and think. This means if you do things that people who feel good do, it will help you feel better.  Sometimes this is all it takes.  There is also a place for medication and therapy depending on how severe your depression is, so be sure to consult with your medical provider and/ or Behavioral Health Consultant if your symptoms are worsening or not improving.     In order to better manage my stress, I will:    Exercise  Get some form of exercise, every day. This will help reduce pain and release endorphins, the  feel good  chemicals in your brain. This is almost as good as taking antidepressants!  This is not the same as joining a gym and then never going! (they count on that by the way ) It can be as simple as just going for a walk or doing some gardening, anything that will get you moving.      Hygiene   Maintain good hygiene (Get out of bed in the morning, Make your bed, Brush your teeth, Take a shower, and Get dressed like you were going to work, even if you are unemployed).  If your clothes don't fit try to get ones that do.    Diet  I will strive to eat foods that are good for me, drink plenty of water, and avoid excessive sugar, caffeine, alcohol, and other mood-altering substances.  Some foods that are helpful in depression are: complex carbohydrates, B vitamins, flaxseed, fish or fish oil, fresh fruits and vegetables.    Psychotherapy  I agree to participate in Individual Therapy (if recommended).    Medication  If prescribed medications, I agree to take them.  Missing doses can result  in serious side effects.  I understand that drinking alcohol, or other illicit drug use, may cause potential side effects.  I will not stop my medication abruptly without first discussing it with my provider.    Staying Connected With Others  I will stay in touch with my friends, family members, and my primary care provider/team.    Use your imagination  Be creative.  We all have a creative side; it doesn t matter if it s oil painting, sand castles, or mud pies! This will also kick up the endorphins.    Witness Beauty  (AKA stop and smell the roses) Take a look outside, even in mid-winter. Notice colors, textures. Watch the squirrels and birds.     Service to others  Be of service to others.  There is always someone else in need.  By helping others we can  get out of ourselves  and remember the really important things.  This also provides opportunities for practicing all the other parts of the program.    Humor  Laugh and be silly!  Adjust your TV habits for less news and crime-drama and more comedy.    Control your stress  Try breathing deep, massage therapy, biofeedback, and meditation. Find time to relax each day.     My support system    Clinic Contact:  Phone number:    Contact 1:  Phone number:    Contact 2:  Phone number:    [employer]/:  Phone number:    Therapist:  Phone number:    Local crisis center:    Phone number:    Other community support:  Phone number:

## 2017-07-11 NOTE — NURSING NOTE
"Chief Complaint   Patient presents with     Vaginal Problem       Initial /74 (BP Location: Right arm, Patient Position: Chair, Cuff Size: Adult Large)  Pulse 68  Temp 98.4  F (36.9  C) (Oral)  Resp 16  Ht 1.664 m (5' 5.5\")  Wt 83.9 kg (185 lb)  SpO2 98%  Breastfeeding? No  BMI 30.32 kg/m2 Estimated body mass index is 30.32 kg/(m^2) as calculated from the following:    Height as of this encounter: 1.664 m (5' 5.5\").    Weight as of this encounter: 83.9 kg (185 lb).  Medication Reconciliation: complete     Bridget Ritchie MA       "

## 2017-07-12 LAB
C TRACH DNA SPEC QL NAA+PROBE: NORMAL
N GONORRHOEA DNA SPEC QL NAA+PROBE: NORMAL
SPECIMEN SOURCE: NORMAL
SPECIMEN SOURCE: NORMAL

## 2017-07-12 ASSESSMENT — ANXIETY QUESTIONNAIRES: GAD7 TOTAL SCORE: 4

## 2017-07-12 ASSESSMENT — PATIENT HEALTH QUESTIONNAIRE - PHQ9: SUM OF ALL RESPONSES TO PHQ QUESTIONS 1-9: 10

## 2017-07-12 NOTE — PROGRESS NOTES
Deanna Burleson  Your gonorrhea and chlamydia tests were negative.    Please call or MyChart my office with any questions or concerns.    Kay Alvarez, PAC

## 2017-08-31 PROBLEM — M54.2 NECK PAIN: Status: RESOLVED | Noted: 2017-05-24 | Resolved: 2017-08-31

## 2017-08-31 NOTE — PATIENT INSTRUCTIONS
Patient was only seen for initial evaluation ( 2nd appointment was canceled due to therapist being out sick and this was not rescheduled)Patient was only seen for initial evaluation and did not return for for further therapy as suggested.  Will resolve episode at this time.  and did not return for for further therapy as suggested.  Will resolve episode at this time.

## 2017-12-22 ENCOUNTER — HEALTH MAINTENANCE LETTER (OUTPATIENT)
Age: 22
End: 2017-12-22

## 2018-01-31 ENCOUNTER — OFFICE VISIT (OUTPATIENT)
Dept: FAMILY MEDICINE | Facility: CLINIC | Age: 23
End: 2018-01-31
Payer: COMMERCIAL

## 2018-01-31 VITALS
WEIGHT: 197 LBS | HEART RATE: 82 BPM | TEMPERATURE: 98.1 F | BODY MASS INDEX: 31.66 KG/M2 | DIASTOLIC BLOOD PRESSURE: 70 MMHG | SYSTOLIC BLOOD PRESSURE: 100 MMHG | OXYGEN SATURATION: 98 % | HEIGHT: 66 IN | RESPIRATION RATE: 16 BRPM

## 2018-01-31 DIAGNOSIS — Z11.3 SCREENING FOR STDS (SEXUALLY TRANSMITTED DISEASES): ICD-10-CM

## 2018-01-31 DIAGNOSIS — Z13.1 SCREENING FOR DIABETES MELLITUS: ICD-10-CM

## 2018-01-31 DIAGNOSIS — Z12.4 SCREENING FOR MALIGNANT NEOPLASM OF CERVIX: ICD-10-CM

## 2018-01-31 DIAGNOSIS — Z13.21 ENCOUNTER FOR VITAMIN DEFICIENCY SCREENING: ICD-10-CM

## 2018-01-31 DIAGNOSIS — E66.09 CLASS 1 OBESITY DUE TO EXCESS CALORIES WITHOUT SERIOUS COMORBIDITY WITH BODY MASS INDEX (BMI) OF 30.0 TO 30.9 IN ADULT: ICD-10-CM

## 2018-01-31 DIAGNOSIS — R63.5 WEIGHT GAIN: ICD-10-CM

## 2018-01-31 DIAGNOSIS — Z23 NEED FOR PROPHYLACTIC VACCINATION AND INOCULATION AGAINST INFLUENZA: ICD-10-CM

## 2018-01-31 DIAGNOSIS — Z00.01 ENCOUNTER FOR ROUTINE ADULT HEALTH EXAMINATION WITH ABNORMAL FINDINGS: Primary | ICD-10-CM

## 2018-01-31 DIAGNOSIS — E66.811 CLASS 1 OBESITY DUE TO EXCESS CALORIES WITHOUT SERIOUS COMORBIDITY WITH BODY MASS INDEX (BMI) OF 30.0 TO 30.9 IN ADULT: ICD-10-CM

## 2018-01-31 DIAGNOSIS — E55.9 VITAMIN D DEFICIENCY: ICD-10-CM

## 2018-01-31 DIAGNOSIS — F33.1 MAJOR DEPRESSIVE DISORDER, RECURRENT EPISODE, MODERATE (H): ICD-10-CM

## 2018-01-31 DIAGNOSIS — Z13.220 SCREENING FOR HYPERLIPIDEMIA: ICD-10-CM

## 2018-01-31 LAB
DEPRECATED CALCIDIOL+CALCIFEROL SERPL-MC: 14 UG/L (ref 20–75)
TSH SERPL DL<=0.005 MIU/L-ACNC: 1.29 MU/L (ref 0.4–4)

## 2018-01-31 PROCEDURE — 87491 CHLMYD TRACH DNA AMP PROBE: CPT | Performed by: PHYSICIAN ASSISTANT

## 2018-01-31 PROCEDURE — 99395 PREV VISIT EST AGE 18-39: CPT | Mod: 25 | Performed by: PHYSICIAN ASSISTANT

## 2018-01-31 PROCEDURE — 90686 IIV4 VACC NO PRSV 0.5 ML IM: CPT | Performed by: PHYSICIAN ASSISTANT

## 2018-01-31 PROCEDURE — 84443 ASSAY THYROID STIM HORMONE: CPT | Performed by: PHYSICIAN ASSISTANT

## 2018-01-31 PROCEDURE — 90471 IMMUNIZATION ADMIN: CPT | Performed by: PHYSICIAN ASSISTANT

## 2018-01-31 PROCEDURE — 82306 VITAMIN D 25 HYDROXY: CPT | Performed by: PHYSICIAN ASSISTANT

## 2018-01-31 PROCEDURE — G0145 SCR C/V CYTO,THINLAYER,RESCR: HCPCS | Performed by: PHYSICIAN ASSISTANT

## 2018-01-31 PROCEDURE — 87591 N.GONORRHOEAE DNA AMP PROB: CPT | Performed by: PHYSICIAN ASSISTANT

## 2018-01-31 PROCEDURE — 36415 COLL VENOUS BLD VENIPUNCTURE: CPT | Performed by: PHYSICIAN ASSISTANT

## 2018-01-31 ASSESSMENT — ANXIETY QUESTIONNAIRES
2. NOT BEING ABLE TO STOP OR CONTROL WORRYING: MORE THAN HALF THE DAYS
5. BEING SO RESTLESS THAT IT IS HARD TO SIT STILL: MORE THAN HALF THE DAYS
3. WORRYING TOO MUCH ABOUT DIFFERENT THINGS: MORE THAN HALF THE DAYS
7. FEELING AFRAID AS IF SOMETHING AWFUL MIGHT HAPPEN: MORE THAN HALF THE DAYS
1. FEELING NERVOUS, ANXIOUS, OR ON EDGE: MORE THAN HALF THE DAYS
6. BECOMING EASILY ANNOYED OR IRRITABLE: NEARLY EVERY DAY
GAD7 TOTAL SCORE: 15
IF YOU CHECKED OFF ANY PROBLEMS ON THIS QUESTIONNAIRE, HOW DIFFICULT HAVE THESE PROBLEMS MADE IT FOR YOU TO DO YOUR WORK, TAKE CARE OF THINGS AT HOME, OR GET ALONG WITH OTHER PEOPLE: SOMEWHAT DIFFICULT

## 2018-01-31 ASSESSMENT — PAIN SCALES - GENERAL: PAINLEVEL: NO PAIN (0)

## 2018-01-31 ASSESSMENT — PATIENT HEALTH QUESTIONNAIRE - PHQ9: 5. POOR APPETITE OR OVEREATING: MORE THAN HALF THE DAYS

## 2018-01-31 NOTE — NURSING NOTE
"Chief Complaint   Patient presents with     Physical       Initial /70 (BP Location: Right arm, Patient Position: Chair, Cuff Size: Adult Large)  Pulse 82  Temp 98.1  F (36.7  C) (Oral)  Resp 16  Ht 1.683 m (5' 6.25\")  Wt 89.4 kg (197 lb)  SpO2 98%  Breastfeeding? No  BMI 31.56 kg/m2 Estimated body mass index is 31.56 kg/(m^2) as calculated from the following:    Height as of this encounter: 1.683 m (5' 6.25\").    Weight as of this encounter: 89.4 kg (197 lb).  Medication Reconciliation: complete     Bridget Ritchie MA       "
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
## 2018-01-31 NOTE — PROGRESS NOTES
Deanna Burleson  Your thyroid function was normal.   Your vitamin D level is still pending.   Please call or MyChart my office with any questions or concerns.    Kay Alvarez, PAC

## 2018-01-31 NOTE — LETTER
[address] [address]  [phone]        February 5, 2019    Sarah Beth Morales  [address] [address]              Dear Sarah Beth Morales    This is to remind you that your fasting labs are due.    You may call our office at [phone] to schedule an appointment.    Please disregard this notice if you have already had your labs drawn or made an appointment.        Sincerely,        Kay Alvarez PA-C

## 2018-01-31 NOTE — MR AVS SNAPSHOT
After Visit Summary   1/31/2018    Sarah Beth Morales    MRN: [id number]           Patient Information     Date Of Birth          1995        Visit Information        Provider Department      1/31/2018 3:00 PM Kay Alavrez PA-C [facility]        Today's Diagnoses     Screening for malignant neoplasm of cervix    -  1    Screening for diabetes mellitus        Screening for hyperlipidemia        Weight gain        Encounter for vitamin deficiency screening        Class 1 obesity due to excess calories without serious comorbidity with body mass index (BMI) of 30.0 to 30.9 in adult          Care Instructions    Please schedule a fasting lab appointment (nothing to eat or drink 10 hours prior except water and/or your medications) at your earliest convenience.      Schedule appointment with nutritionist- check into insurance coverage for obesity    Schedule appointment with Dr. Verduzco  Or Dr. Libra Hanley for nexplanon removal.  Think about what you would like for contraception- oral contraceptive pill or nuva ring.       At [facility], we strive to deliver an exceptional experience to you, every time we see you.  If you receive a survey in the mail, please send us back your thoughts. We really do value your feedback.      Suggested websites for health information:  Www.[employer].[place] [employer] : Up to date and easily searchable information on multiple topics.  Www.medlineplus.gov : medication info, interactive tutorials, watch real surgeries online  Www.familydoctor.org : good info from the Academy of Family Physicians  Www.cdc.gov : public health info, travel advisories, epidemics (H1N1)  Www.aap.org : children's health info, normal development, vaccinations  Www.health.[facility].[place].us : [place] dept of health, public health issues in [place], N1N1    Your care team:     Family Medicine   NII Rodriguez MD Emily Bunt, APRN CNP   S. Kentrell Verduzco MD    MD Roopa Curran MD         Clinic hours: Monday - Wednesday 7 am-7 pm   Thursdays and Fridays 7 am-5 pm.     [facility]: Monday - Friday 11 am-9 pm,   Saturday and Sunday 9 am-5 pm.    [facility]: Monday -Thursday 8 am-8 pm; Friday 8 am-6 pm; Saturday and Sunday 9 am-5 pm.     [facility]: Monday - Thursday 8 am - 7 pm; Friday 8 am - 6 pm    Clinic: [phone]   [facility]: [phone]   [facility]: [phone]            Preventive Health Recommendations  Female Ages 18 to 25     Yearly exam:     See your health care provider every year in order to  o Review health changes.   o Discuss preventive care.    o Review your medicines if your doctor has prescribed any.      You should be tested each year for STDs (sexually transmitted diseases).       After age 20, talk to your provider about how often you should have cholesterol testing.      Starting at age 21, get a Pap test every three years. If you have an abnormal result, your doctor may have you test more often.      If you are at risk for diabetes, you should have a diabetes test (fasting glucose).     Shots:     Get a flu shot each year.     Get a tetanus shot every 10 years.     Consider getting the shot (vaccine) that prevents cervical cancer (Gardasil).    Nutrition:     Eat at least 5 servings of fruits and vegetables each day.    Eat whole-grain bread, whole-wheat pasta and brown rice instead of white grains and rice.    Talk to your provider about Calcium and Vitamin D.     Lifestyle    Exercise at least 150 minutes a week each week (30 minutes a day, 5 days a week). This will help you control your weight and prevent disease.    Limit alcohol to one drink per day.    No smoking.     Wear sunscreen to prevent skin cancer.    See your dentist every six months for an exam and cleaning.          Follow-ups after your visit        Additional  Services     NUTRITION REFERRAL       Your provider has referred you to: [facility]: [facility] - Sayda Almaraz [phone]   [email or web address]  [facility] [place] [phone]   [email or web address]    Please be aware that coverage of these services is subject to the terms and limitations of your health insurance plan.  Call member services at your health plan with any benefit or coverage questions.      Please bring the following to your appointment:      >>   This referral request   >>   Any documents given to you for this referral  >>   Any specific questions you have about diet or food choices                  Future tests that were ordered for you today     Open Future Orders        Priority Expected Expires Ordered    Lipid panel reflex to direct LDL Fasting Routine  1/31/2019 1/31/2018    TSH with free T4 reflex Routine  1/31/2019 1/31/2018    Glucose Routine  1/31/2019 1/31/2018    Vitamin D Deficiency Routine  1/31/2019 1/31/2018            Who to contact     If you have questions or need follow up information about today's clinic visit or your schedule please contact [facility] directly at [phone].  Normal or non-critical lab and imaging results will be communicated to you by MyChart, letter or phone within 4 business days after the clinic has received the results. If you do not hear from us within 7 days, please contact the clinic through Vert[employer] or phone. If you have a critical or abnormal lab result, we will notify you by phone as soon as possible.  Submit refill requests through Self-A-r-T or call your pharmacy and they will forward the refill request to us. Please allow 3 business days for your refill to be completed.          Additional Information About Your Visit        Self-A-r-T Information     Self-A-[employer]-T gives you secure access to your electronic health record. If you see a primary  "care provider, you can also send messages to your care team and make appointments. If you have questions, please call your primary care clinic.  If you do not have a primary care provider, please call [phone] and they will assist you.        Care EveryWhere ID     This is your Care EveryWhere ID. This could be used by other organizations to access your [place] medical records  [id number]        Your Vitals Were     Pulse Temperature Respirations Height Pulse Oximetry Breastfeeding?    82 98.1  F (36.7  C) (Oral) 16 1.683 m (5' 6.25\") 98% No    BMI (Body Mass Index)                   31.56 kg/m2            Blood Pressure from Last 3 Encounters:   01/31/18 100/70   07/11/17 108/74   04/25/17 130/80    Weight from Last 3 Encounters:   01/31/18 89.4 kg (197 lb)   07/11/17 83.9 kg (185 lb)   04/25/17 84.8 kg (187 lb)              We Performed the Following     NUTRITION REFERRAL     Pap imaged thin layer screen only - recommended age 21 - 24 years        Primary Care Provider Office Phone # Fax #    Kay Alvarez PA-C [phone] [address]  [place] [address]        Equal Access to Services     LOUIE PAK : Hadii aad ku hadasho Soomaali, waaxda luqadaha, qaybta kaalmada adeegyada, waxay idiin haymemen malou hutson . So [facility] [phone].    ATENCIÓN: Si habla español, tiene a langley disposición servicios gratuitos de asistencia lingüística. Llame al [phone].    We comply with applicable federal civil rights laws and [place] laws. We do not discriminate on the basis of race, color, national origin, age, disability, sex, sexual orientation, or gender identity.            Thank you!     Thank you for choosing [facility]  for your care. Our goal is always to provide you with excellent care. Hearing back from our patients is one way we can continue to improve our services. Please take a few minutes to complete the written survey that you may receive in " the mail after your visit with us. Thank you!             Your Updated Medication List - Protect others around you: Learn how to safely use, store and throw away your medicines at www.disposemymeds.org.          This list is accurate as of 1/31/18  3:26 PM.  Always use your most recent med list.                   Brand Name Dispense Instructions for use Diagnosis    escitalopram 10 MG tablet    LEXAPRO    90 tablet    Take 1 tablet (10 mg) by mouth daily    Major depressive disorder, recurrent episode, moderate (H), Anxiety       etonogestrel 68 MG Impl    IMPLANON/NEXPLANON    1 each    1 each (68 mg) by Subdermal route once for 1 dose    Presence of subdermal contraceptive device, Insertion of implantable subdermal contraceptive       meloxicam 15 MG tablet    MOBIC    30 tablet    Take 1 tablet (15 mg) by mouth daily    Strain of trapezius muscle, unspecified laterality, subsequent encounter       methocarbamol 500 MG tablet    ROBAXIN    30 tablet    Take 2 tablets (1,000 mg) by mouth 3 times daily as needed for muscle spasms    Strain of trapezius muscle, unspecified laterality, subsequent encounter

## 2018-01-31 NOTE — PATIENT INSTRUCTIONS
Please schedule a fasting lab appointment (nothing to eat or drink 10 hours prior except water and/or your medications) at your earliest convenience.      Schedule appointment with nutritionist- check into insurance coverage for obesity    Schedule appointment with Dr. Verduzco  Or Dr. Libra Hanley for nexplanon removal.  Think about what you would like for contraception- oral contraceptive pill or nuva ring.       At [facility], we strive to deliver an exceptional experience to you, every time we see you.  If you receive a survey in the mail, please send us back your thoughts. We really do value your feedback.      Suggested websites for health information:  Www.[employer].org : Up to date and easily searchable information on multiple topics.  Www.[employer].gov : medication info, interactive tutorials, watch real surgeries online  Www.familydoctor.org : good info from the Academy of Family Physicians  Www.cdc.gov : public health info, travel advisories, epidemics (H1N1)  Www.aap.org : children's health info, normal development, vaccinations  Www.health.[facility].[place].us : [place] dept of health, public health issues in [place], N1N1    Your care team:     Family Medicine   NII Rodriguez MD Emily Bunt, APRN CNP   S. MD Soraya Cordero MD Angela Wermerskirchen, MD         Clinic hours: Monday - Wednesday 7 am-7 pm   Thursdays and Fridays 7 am-5 pm.     [facility]: Monday - Friday 11 am-9 pm,   Saturday and Sunday 9 am-5 pm.    [facility]: Monday -Thursday 8 am-8 pm; Friday 8 am-6 pm; Saturday and Sunday 9 am-5 pm.     [facility]: Monday Thursday 8 am   7 pm; Friday 8 am   6 pm    Clinic: [phone]   [facility]: [phone]   [facility]: [phone]            Preventive Health Recommendations  Female Ages 18 to 25     Yearly exam:     See your health care provider every  year in order to  o Review health changes.   o Discuss preventive care.    o Review your medicines if your doctor has prescribed any.      You should be tested each year for STDs (sexually transmitted diseases).       After age 20, talk to your provider about how often you should have cholesterol testing.      Starting at age 21, get a Pap test every three years. If you have an abnormal result, your doctor may have you test more often.      If you are at risk for diabetes, you should have a diabetes test (fasting glucose).     Shots:     Get a flu shot each year.     Get a tetanus shot every 10 years.     Consider getting the shot (vaccine) that prevents cervical cancer (Gardasil).    Nutrition:     Eat at least 5 servings of fruits and vegetables each day.    Eat whole-grain bread, whole-wheat pasta and brown rice instead of white grains and rice.    Talk to your provider about Calcium and Vitamin D.     Lifestyle    Exercise at least 150 minutes a week each week (30 minutes a day, 5 days a week). This will help you control your weight and prevent disease.    Limit alcohol to one drink per day.    No smoking.     Wear sunscreen to prevent skin cancer.    See your dentist every six months for an exam and cleaning.

## 2018-01-31 NOTE — LETTER
February 4, 2018      Sarah Beth Morales  [address] [address]    Dear MsMusa,      I am happy to inform you that your recent cervical cancer screening test (PAP smear) was normal.      Preventative screenings such as this help to ensure your health for years to come. You should repeat a pap smear in 3 years, unless otherwise directed.      You will still need to return to the clinic every year for your annual exam and other preventive tests.     Please contact the clinic at [phone] if you have further questions.       Sincerely,      Kay Alvarez PA-C/haylee

## 2018-02-01 ENCOUNTER — TELEPHONE (OUTPATIENT)
Dept: FAMILY MEDICINE | Facility: CLINIC | Age: 23
End: 2018-02-01

## 2018-02-01 DIAGNOSIS — E55.9 VITAMIN D DEFICIENCY: ICD-10-CM

## 2018-02-01 RX ORDER — CHOLECALCIFEROL (VITAMIN D3) 50 MCG
2000 TABLET ORAL DAILY
Qty: 100 TABLET | Refills: 3 | Status: SHIPPED | OUTPATIENT
Start: 2018-02-01 | End: 2019-03-20

## 2018-02-01 RX ORDER — ERGOCALCIFEROL 1.25 MG/1
50000 CAPSULE, LIQUID FILLED ORAL DAILY
Qty: 8 CAPSULE | Refills: 0 | Status: SHIPPED | OUTPATIENT
Start: 2018-02-01 | End: 2018-02-07

## 2018-02-01 ASSESSMENT — PATIENT HEALTH QUESTIONNAIRE - PHQ9: SUM OF ALL RESPONSES TO PHQ QUESTIONS 1-9: 10

## 2018-02-01 ASSESSMENT — ANXIETY QUESTIONNAIRES: GAD7 TOTAL SCORE: 15

## 2018-02-01 NOTE — PROGRESS NOTES
Deanna Burleson  Your vitamin D level was low.    I recommend taking 73091 units once a week for 8 weeks then 2000 units daily after that.  Schedule a lab only appointment in approximately 3-4 months to recheck your vitamin D level.    A prescription has been sent to your pharmacy.   Please call or MyChart my office with any questions or concerns.    Kay Alvarez, PAC

## 2018-02-02 LAB
COPATH REPORT: NORMAL
PAP: NORMAL

## 2018-02-04 PROBLEM — E66.811 CLASS 1 OBESITY DUE TO EXCESS CALORIES WITHOUT SERIOUS COMORBIDITY WITH BODY MASS INDEX (BMI) OF 30.0 TO 30.9 IN ADULT: Status: ACTIVE | Noted: 2018-02-04

## 2018-02-04 PROBLEM — E66.09 CLASS 1 OBESITY DUE TO EXCESS CALORIES WITHOUT SERIOUS COMORBIDITY WITH BODY MASS INDEX (BMI) OF 30.0 TO 30.9 IN ADULT: Status: ACTIVE | Noted: 2018-02-04

## 2018-02-04 LAB
C TRACH DNA SPEC QL NAA+PROBE: NEGATIVE
N GONORRHOEA DNA SPEC QL NAA+PROBE: NEGATIVE
SPECIMEN SOURCE: NORMAL
SPECIMEN SOURCE: NORMAL

## 2018-02-07 RX ORDER — ERGOCALCIFEROL 1.25 MG/1
50000 CAPSULE, LIQUID FILLED ORAL WEEKLY
Qty: 8 CAPSULE | Refills: 0 | Status: SHIPPED | OUTPATIENT
Start: 2018-02-07 | End: 2018-04-12

## 2018-02-07 NOTE — TELEPHONE ENCOUNTER
Reason for Call:  Other prescription    Detailed comments: Patient called please clarify frequency of this medication it now reads 1x day but the written orders says 1x a week for 8 weeks. Please call pharmacy to fix.    Phone Number Patient can be reached at: Home number on file [phone] (home)    Best Time: any    Can we leave a detailed message on this number? YES    Call taken on 2/7/2018 at 11:35 AM by Pooja Gannon

## 2018-02-07 NOTE — TELEPHONE ENCOUNTER
Called patient prescription for vitamin d 50,000 was written to take on capsule daily.      Patient stated pharmacy would not fill this prescription based on that dosing    Please send new script with correct dosing.    Ok to close encounter went sent.

## 2018-02-20 ENCOUNTER — OFFICE VISIT (OUTPATIENT)
Dept: FAMILY MEDICINE | Facility: CLINIC | Age: 23
End: 2018-02-20
Payer: COMMERCIAL

## 2018-02-20 VITALS
BODY MASS INDEX: 32.55 KG/M2 | HEIGHT: 65 IN | HEART RATE: 68 BPM | WEIGHT: 195.4 LBS | SYSTOLIC BLOOD PRESSURE: 120 MMHG | RESPIRATION RATE: 18 BRPM | DIASTOLIC BLOOD PRESSURE: 80 MMHG | TEMPERATURE: 98.3 F | OXYGEN SATURATION: 100 %

## 2018-02-20 DIAGNOSIS — Z30.46 NEXPLANON REMOVAL: Primary | ICD-10-CM

## 2018-02-20 PROCEDURE — 11982 REMOVE DRUG IMPLANT DEVICE: CPT | Performed by: FAMILY MEDICINE

## 2018-02-20 PROCEDURE — 99207 ZZC DROP WITH A PROCEDURE: CPT | Mod: 25 | Performed by: FAMILY MEDICINE

## 2018-02-20 NOTE — PROGRESS NOTES
SUBJECTIVE:   Sarah Beth Morales is a 22 year old female who presents to clinic today for the following health issues:      nexplanon Removal LT arm    Discussed risks and benefits of proposed procedure - patient agreed to proceed.   Nexplanon device easily palpable at the skin surface. Skin was marked at the distal end of the Nexplanon.  Skin prepped clean with alcohol.   Anesthetized with 1% lidocaine plus epinephrine.  #11 scalpel used to make a small incision near the distal end of the device.  Using manipulation and blunt dissection the device was delivered through the incision.  No complications and negligible bleeding.  Bud Verduzco M.D.

## 2018-02-20 NOTE — MR AVS SNAPSHOT
"              After Visit Summary   2/20/2018    Sarah Beth Morales    MRN: [id number]           Patient Information     Date Of Birth          1995        Visit Information        Provider Department      2/20/2018 3:40 PM Maame Verduzco MD; BA PROCEDURE ROOM [facility]        Today's Diagnoses     Nexplanon removal    -  1       Follow-ups after your visit        Who to contact     If you have questions or need follow up information about today's clinic visit or your schedule please contact [facility] directly at [phone].  Normal or non-critical lab and imaging results will be communicated to you by [employer], letter or phone within 4 business days after the clinic has received the results. If you do not hear from us within 7 days, please contact the clinic through [employer] or phone. If you have a critical or abnormal lab result, we will notify you by phone as soon as possible.  Submit refill requests through [employer] or call your pharmacy and they will forward the refill request to us. Please allow 3 business days for your refill to be completed.          Additional Information About Your Visit        MyChart Information     [employer] gives you secure access to your electronic health record. If you see a primary care provider, you can also send messages to your care team and make appointments. If you have questions, please call your primary care clinic.  If you do not have a primary care provider, please call [phone] and they will assist you.        Care EveryWhere ID     This is your Care EveryWhere ID. This could be used by other organizations to access your [facility] medical records  [id number]        Your Vitals Were     Pulse Temperature Respirations Height Pulse Oximetry BMI (Body Mass Index)    68 98.3  F (36.8  C) (Oral) 18 1.651 m (5' 5\") 100% 32.52 kg/m2       Blood Pressure from Last 3 Encounters:   02/20/18 120/80   01/31/18 100/70   07/11/17 108/74    " Weight from Last 3 Encounters:   02/20/18 88.6 kg (195 lb 6.4 oz)   01/31/18 89.4 kg (197 lb)   07/11/17 83.9 kg (185 lb)              We Performed the Following     REMOVAL NON-BIODEGRADABLE DRUG DELIVERY IMPLANT        Primary Care Provider Office Phone # Fax #    Kay Alvarez PA-C [phone] [address]  [facility] [address]        Equal Access to Services     LOUIE PAK : Hadii aad ku hadasho Soomaali, waaxda luqadaha, qaybta kaalmada adeegyada, waxay idiin hayaan adeeg kharash la'chito . So [facility] [phone].    ATENCIÓN: Si habla español, tiene a langley disposición servicios gratuitos de asistencia lingüística. [facility] [phone].    We comply with applicable federal civil rights laws and [place] laws. We do not discriminate on the basis of race, color, national origin, age, disability, sex, sexual orientation, or gender identity.            Thank you!     Thank you for choosing [facility]  for your care. Our goal is always to provide you with excellent care. Hearing back from our patients is one way we can continue to improve our services. Please take a few minutes to complete the written survey that you may receive in the mail after your visit with us. Thank you!             Your Updated Medication List - Protect others around you: Learn how to safely use, store and throw away your medicines at www.disposemymeds.org.          This list is accurate as of 2/20/18  4:03 PM.  Always use your most recent med list.                   Brand Name Dispense Instructions for use Diagnosis    escitalopram 10 MG tablet    LEXAPRO    90 tablet    Take 1 tablet (10 mg) by mouth daily    Major depressive disorder, recurrent episode, moderate (H), Anxiety       etonogestrel 68 MG Impl    IMPLANON/NEXPLANON    1 each    1 each (68 mg) by Subdermal route once for 1 dose    Presence of subdermal contraceptive device, Insertion of implantable subdermal contraceptive        meloxicam 15 MG tablet    MOBIC    30 tablet    Take 1 tablet (15 mg) by mouth daily    Strain of trapezius muscle, unspecified laterality, subsequent encounter       methocarbamol 500 MG tablet    ROBAXIN    30 tablet    Take 2 tablets (1,000 mg) by mouth 3 times daily as needed for muscle spasms    Strain of trapezius muscle, unspecified laterality, subsequent encounter       vitamin D 2000 UNITS tablet     100 tablet    Take 2,000 Units by mouth daily    Vitamin D deficiency       vitamin D 92897 UNIT capsule    ERGOCALCIFEROL    8 capsule    Take 1 capsule (50,000 Units) by mouth once a week    Vitamin D deficiency

## 2018-02-20 NOTE — NURSING NOTE
"Chief Complaint   Patient presents with     Lesion Removal       Initial /80 (BP Location: Right arm, Patient Position: Sitting, Cuff Size: Adult Regular)  Pulse 68  Temp 98.3  F (36.8  C) (Oral)  Resp 18  Ht 1.651 m (5' 5\")  Wt 88.6 kg (195 lb 6.4 oz)  SpO2 100%  BMI 32.52 kg/m2 Estimated body mass index is 32.52 kg/(m^2) as calculated from the following:    Height as of this encounter: 1.651 m (5' 5\").    Weight as of this encounter: 88.6 kg (195 lb 6.4 oz).  Medication Reconciliation: abbi López        "

## 2018-03-27 DIAGNOSIS — E55.9 VITAMIN D DEFICIENCY: ICD-10-CM

## 2018-03-27 RX ORDER — CHOLECALCIFEROL (VITAMIN D3) 50 MCG
2000 TABLET ORAL DAILY
Qty: 100 TABLET | Refills: 3 | Status: CANCELLED | OUTPATIENT
Start: 2018-03-27

## 2018-03-27 NOTE — TELEPHONE ENCOUNTER
"Requested Prescriptions   Pending Prescriptions Disp Refills     Cholecalciferol (VITAMIN D) 2000 UNITS tablet  Last Written Prescription Date:  2/01/18  Last Fill Quantity: 100 tablet,  # refills: 3   Last office visit: 2/20/2018 with prescribing provider:  Dr. Verduzco   Future Office Visit:   100 tablet 3     Sig: Take 2,000 Units by mouth daily    Vitamin Supplements (Adult) Protocol Passed    3/27/2018 12:12 PM       Passed - High dose Vitamin D not ordered       Passed - Recent (12 mo) or future (30 days) visit within the authorizing provider's specialty    Patient had office visit in the last 12 months or has a visit in the next 30 days with authorizing provider or within the authorizing provider's specialty.  See \"Patient Info\" tab in inbasket, or \"Choose Columns\" in Meds & Orders section of the refill encounter.              "

## 2018-03-28 NOTE — TELEPHONE ENCOUNTER
The following prescription was sent to [facility]:    Cholecalciferol (VITAMIN D) 2000 UNITS tablet 100 tablet 3 2/1/2018  --   Sig: Take 2,000 Units by mouth daily   Class: E-Prescribe   Route: Oral   Order: [id number]   E-Prescribing Status: Receipt confirmed by pharmacy (2/1/2018  8:07 AM CST)     Request denied.  Too soon to fill  Sravanthi Andersen RN

## 2018-04-02 ENCOUNTER — TELEPHONE (OUTPATIENT)
Dept: FAMILY MEDICINE | Facility: CLINIC | Age: 23
End: 2018-04-02

## 2018-04-02 DIAGNOSIS — E55.9 VITAMIN D DEFICIENCY: ICD-10-CM

## 2018-04-02 RX ORDER — ERGOCALCIFEROL 1.25 MG/1
50000 CAPSULE, LIQUID FILLED ORAL WEEKLY
Qty: 8 CAPSULE | Refills: 0 | Status: CANCELLED | OUTPATIENT
Start: 2018-04-02

## 2018-04-02 NOTE — LETTER
April 12, 2018      Sarah Bteh Morales  [address] [id number]        Dear Sarah Beth,     We have attempted to reach you by phone and MyChart.   Please schedule a lab only appointment at your earliest convenience to check your vitamin D level.  Please call or MyChart my office with any questions or concerns.        Sincerely,        Kay Alvarez PA-C

## 2018-04-02 NOTE — TELEPHONE ENCOUNTER
Requested Prescriptions   Pending Prescriptions Disp Refills     vitamin D (ERGOCALCIFEROL) 28818 UNIT capsule 8 capsule 0     Sig: Take 1 capsule (50,000 Units) by mouth once a week    There is no refill protocol information for this order        vitamin D (ERGOCALCIFEROL) 69940 UNIT capsule  Last Written Prescription Date:  2/7/18  Last Fill Quantity: 8,  # refills: 0   Last office visit: 2/20/2018 with prescribing provider:  Kay Alvarez   Future Office Visit:

## 2018-04-04 NOTE — TELEPHONE ENCOUNTER
Patient is due for Vitamin D labs.     TC to please call patient and schedule lab appointment. Thank you.    John Varghese RN, BSN

## 2018-04-09 NOTE — TELEPHONE ENCOUNTER
This writer attempted to contact pt on 04/09/18      Reason for call schedule non-fasting lab appt and left message.      If patient calls back:   Schedule Lab appointment within 1 week with lab, document that pt called and close encounter         Montserrat Archibald MA

## 2018-04-12 NOTE — TELEPHONE ENCOUNTER
LM for pt to call clinic.  3rd attempt, with no response.  Please advise.      Montserrat BOGGS, Patient Care

## 2018-04-19 DIAGNOSIS — E55.9 VITAMIN D DEFICIENCY: ICD-10-CM

## 2018-04-19 NOTE — TELEPHONE ENCOUNTER
Requested Prescriptions   Pending Prescriptions Disp Refills     vitamin D (ERGOCALCIFEROL) 98918 UNIT capsule 8 capsule 0     Sig: Take 1 capsule (50,000 Units) by mouth once a week    There is no refill protocol information for this order        vitamin D (ERGOCALCIFEROL) 20231 UNIT capsule (Discontinued)      Last Written Prescription Date:  2/1/18  Last Fill Quantity: 8,   # refills: 0  Last Office Visit: 2/20/18  Future Office visit:       Routing refill request to provider for review/approval because:  Drug not active on patient's medication list

## 2018-04-20 RX ORDER — ERGOCALCIFEROL 1.25 MG/1
50000 CAPSULE, LIQUID FILLED ORAL WEEKLY
Qty: 8 CAPSULE | Refills: 0
Start: 2018-04-20

## 2018-04-20 NOTE — TELEPHONE ENCOUNTER
Per telephone encounter on 4/2/18, patient has been notified via letter and MyChart that she needs Vitamin D labs done before this can be refilled. Closing encounter.    John Varghese RN, BSN

## 2018-05-22 DIAGNOSIS — B37.31 CANDIDIASIS OF VULVA AND VAGINA: Primary | ICD-10-CM

## 2018-05-22 NOTE — TELEPHONE ENCOUNTER
"Requested Prescriptions   Pending Prescriptions Disp Refills     fluconazole (DIFLUCAN) 150 MG tablet  Last Written Prescription Date:  7/11/17  Last Fill Quantity: 1 tablet,  # refills: 2   Last office visit: 2/20/2018 with prescribing provider:  Dr. Verduzco   Future Office Visit:    Routing refill request to provider for review/approval because:  Drug not active on patient's medication list   1 tablet 0     Sig: Take 1 tablet (150 mg) by mouth once for 1 dose    Antifungal Agents Failed    5/22/2018  9:19 AM       Failed - Not Fluconazole or Terconazole     If oral Fluconazole or Terconazole, may refill if indicated in progress notes.          Passed - Recent (12 mo) or future (30 days) visit within the authorizing provider's specialty    Patient had office visit in the last 12 months or has a visit in the next 30 days with authorizing provider or within the authorizing provider's specialty.  See \"Patient Info\" tab in inbasket, or \"Choose Columns\" in Meds & Orders section of the refill encounter.              "

## 2018-05-24 RX ORDER — FLUCONAZOLE 150 MG/1
150 TABLET ORAL ONCE
Qty: 1 TABLET | Refills: 0 | Status: SHIPPED | OUTPATIENT
Start: 2018-05-24 | End: 2018-05-24

## 2018-05-24 NOTE — TELEPHONE ENCOUNTER
Prescription refilled. Please notify patient and advise to schedule office visit if symptoms do not improve

## 2018-05-24 NOTE — TELEPHONE ENCOUNTER
Routing refill request to provider for review/approval because:  Drug not active on patient's medication list  Julia Broussard RN

## 2018-06-08 ENCOUNTER — TELEPHONE (OUTPATIENT)
Dept: FAMILY MEDICINE | Facility: CLINIC | Age: 23
End: 2018-06-08

## 2018-06-08 NOTE — TELEPHONE ENCOUNTER
Panel Management Review      BP Readings from Last 1 Encounters:   02/20/18 120/80    , No results found for: A1C, 4/2/2018  Last Office Visit with this department: 4/2/2018    Fail List measure:     Depression / Dysthymia review    Measure:  Needs PHQ-9 score of 4 or less during index window.  Administer PHQ-9 and if score is 5 or more, send encounter to provider for next steps.    5 - 7 month window range: 5/31-9/30    PHQ-9 SCORE 4/25/2017 7/11/2017 1/31/2018   Total Score 9 10 10       If PHQ-9 recheck is 5 or more, route to provider for next steps.    Patient is due for:  PHQ9      Patient is due/failing the following:   PHQ9    Action needed:   Patient needs to do PHQ9.    Type of outreach:    Sent [employer] message.    Questions for provider review:    None                                                                                                                                    Zohreh López CMA

## 2018-11-29 ENCOUNTER — TELEPHONE (OUTPATIENT)
Dept: FAMILY MEDICINE | Facility: CLINIC | Age: 23
End: 2018-11-29

## 2018-11-29 DIAGNOSIS — F33.1 MAJOR DEPRESSIVE DISORDER, RECURRENT EPISODE, MODERATE (H): ICD-10-CM

## 2018-11-29 DIAGNOSIS — F41.9 ANXIETY: ICD-10-CM

## 2018-11-29 NOTE — TELEPHONE ENCOUNTER
"Requested Prescriptions   Pending Prescriptions Disp Refills     escitalopram (LEXAPRO) 10 MG tablet 90 tablet 1     Sig: Take 1 tablet (10 mg) by mouth daily    SSRIs Protocol Failed    11/29/2018 10:35 AM       Failed - PHQ-9 score less than 5 in past 6 months    Please review last PHQ-9 score.          Failed - Recent (6 mo) or future (30 days) visit within the authorizing provider's specialty    Patient had office visit in the last 6 months or has a visit in the next 30 days with authorizing provider or within the authorizing provider's specialty.  See \"Patient Info\" tab in inbasket, or \"Choose Columns\" in Meds & Orders section of the refill encounter.           Passed - Patient is age 18 or older       Passed - No active pregnancy on record       Passed - No positive pregnancy test in last 12 months          escitalopram (LEXAPRO) 10 MG tablet  Last Written Prescription Date:  4/25/18  Last Fill Quantity: 90,  # refills: 1   Last office visit: 2/20/2018 with prescribing provider:  Kay Alvarez   Future Office Visit:      PHQ-9 SCORE 4/25/2017 7/11/2017 1/31/2018   PHQ-9 Total Score 9 10 10     MIREYA-7 SCORE 4/25/2017 7/11/2017 1/31/2018   Total Score 6 4 15         "

## 2018-11-30 RX ORDER — ESCITALOPRAM OXALATE 10 MG/1
10 TABLET ORAL DAILY
Qty: 30 TABLET | Refills: 0 | Status: SHIPPED | OUTPATIENT
Start: 2018-11-30 | End: 2019-01-03

## 2018-11-30 NOTE — TELEPHONE ENCOUNTER
Routing refill request to provider for review/approval because:  A break in medication  Julia Bruossard RN

## 2019-01-03 ENCOUNTER — OFFICE VISIT (OUTPATIENT)
Dept: FAMILY MEDICINE | Facility: CLINIC | Age: 24
End: 2019-01-03
Payer: COMMERCIAL

## 2019-01-03 VITALS
WEIGHT: 190 LBS | TEMPERATURE: 98.4 F | DIASTOLIC BLOOD PRESSURE: 80 MMHG | BODY MASS INDEX: 30.53 KG/M2 | SYSTOLIC BLOOD PRESSURE: 118 MMHG | HEIGHT: 66 IN | HEART RATE: 90 BPM | OXYGEN SATURATION: 90 %

## 2019-01-03 DIAGNOSIS — F33.1 MAJOR DEPRESSIVE DISORDER, RECURRENT EPISODE, MODERATE (H): ICD-10-CM

## 2019-01-03 DIAGNOSIS — F41.9 ANXIETY: ICD-10-CM

## 2019-01-03 PROCEDURE — 99214 OFFICE O/P EST MOD 30 MIN: CPT | Performed by: PHYSICIAN ASSISTANT

## 2019-01-03 RX ORDER — ESCITALOPRAM OXALATE 10 MG/1
10 TABLET ORAL
Qty: 60 TABLET | Refills: 1 | Status: SHIPPED | OUTPATIENT
Start: 2019-01-03 | End: 2019-05-22

## 2019-01-03 ASSESSMENT — ANXIETY QUESTIONNAIRES
2. NOT BEING ABLE TO STOP OR CONTROL WORRYING: SEVERAL DAYS
6. BECOMING EASILY ANNOYED OR IRRITABLE: MORE THAN HALF THE DAYS
IF YOU CHECKED OFF ANY PROBLEMS ON THIS QUESTIONNAIRE, HOW DIFFICULT HAVE THESE PROBLEMS MADE IT FOR YOU TO DO YOUR WORK, TAKE CARE OF THINGS AT HOME, OR GET ALONG WITH OTHER PEOPLE: SOMEWHAT DIFFICULT
GAD7 TOTAL SCORE: 10
3. WORRYING TOO MUCH ABOUT DIFFERENT THINGS: SEVERAL DAYS
7. FEELING AFRAID AS IF SOMETHING AWFUL MIGHT HAPPEN: SEVERAL DAYS
5. BEING SO RESTLESS THAT IT IS HARD TO SIT STILL: MORE THAN HALF THE DAYS
1. FEELING NERVOUS, ANXIOUS, OR ON EDGE: MORE THAN HALF THE DAYS

## 2019-01-03 ASSESSMENT — PAIN SCALES - GENERAL: PAINLEVEL: NO PAIN (0)

## 2019-01-03 ASSESSMENT — PATIENT HEALTH QUESTIONNAIRE - PHQ9
SUM OF ALL RESPONSES TO PHQ QUESTIONS 1-9: 19
5. POOR APPETITE OR OVEREATING: SEVERAL DAYS

## 2019-01-03 ASSESSMENT — MIFFLIN-ST. JEOR: SCORE: 1637.71

## 2019-01-03 NOTE — PROGRESS NOTES
SUBJECTIVE:   Sarah Beth Morales is a 23 year old female who presents to clinic today for the following health issues:      Depression and Anxiety Follow-Up    Status since last visit: No change    Other associated symptoms:None    Complicating factors: None    Significant life event: Yes- moved out of mother's home    Current substance abuse: None      - Pt states she has a tooth abscess and would like an antibiotic     PHQ 7/11/2017 1/31/2018 1/3/2019   PHQ-9 Total Score 10 10 19   Q9: Suicide Ideation Not at all Several days Several days     MIREYA-7 SCORE 7/11/2017 1/31/2018 1/3/2019   Total Score 4 15 10       PHQ-9  English  PHQ-9   Any Language  MIREYA-7  Suicide Assessment Five-step Evaluation and Treatment (SAFE-T)    Amount of exercise or physical activity: None    Problems taking medications regularly: No    Medication side effects: none    Diet: regular (no restrictions)    Taking lexapro 10 mg - think will need to take twice a day- thinks wears off.  Has taken twice a day in past   Super irritable at 5pm and sleeps horrible up all night   Would like to see psychiatrist  And therapist   Has worked with therapist in past   Irritability is the worst- how reactive I am and sleep  Mood is up and down a lot. Moving slower- want to sleep all the time  No kids   [employer] and taken care of  Moved out of mom's home- reports she does not get along with her mom   Now living with family friend who she calls her auntie- works as PCA for her daughter who has cerebal palsy, blind, deaf and epilepsy  No thoughts of harming herself- thoughts would be better if didn't wake up         Problem list and histories reviewed & adjusted, as indicated.  Additional history: as documented    Patient Active Problem List   Diagnosis     Allergic rhinitis     Presence of subdermal contraceptive device     Major depressive disorder, recurrent episode, moderate (H)     Anxiety     Elevated blood pressure reading without diagnosis of  hypertension     Non morbid obesity due to excess calories     Class 1 obesity due to excess calories without serious comorbidity with body mass index (BMI) of 30.0 to 30.9 in adult     Past Surgical History:   Procedure Laterality Date     DENTAL SURGERY         Social History     Tobacco Use     Smoking status: Never Smoker     Smokeless tobacco: Never Used   Substance Use Topics     Alcohol use: No     Comment: drinking once  a month      Family History   Problem Relation Age of Onset     Diabetes Mother      Psychotic Disorder Mother         depression     Diabetes Father      Heart Disease Maternal Grandfather         age 45 first MI      Lipids Maternal Grandfather      Hypertension Maternal Grandfather      Psychotic Disorder Maternal Grandfather         depression         Current Outpatient Medications   Medication Sig Dispense Refill     Cholecalciferol (VITAMIN D) 2000 UNITS tablet Take 2,000 Units by mouth daily 100 tablet 3     escitalopram (LEXAPRO) 10 MG tablet Take 1 tablet (10 mg) by daily NO FURTHER REFILLS WITHOUT FOLLOW UP WITH ME       meloxicam (MOBIC) 15 MG tablet Take 1 tablet (15 mg) by mouth daily 30 tablet 1     methocarbamol (ROBAXIN) 500 MG tablet Take 2 tablets (1,000 mg) by mouth 3 times daily as needed for muscle spasms 30 tablet 1     etonogestrel (IMPLANON/NEXPLANON) 68 MG IMPL 1 each (68 mg) by Subdermal route once for 1 dose 1 each 0     BP Readings from Last 3 Encounters:   01/03/19 118/80   02/20/18 120/80   01/31/18 100/70    Wt Readings from Last 3 Encounters:   01/03/19 86.2 kg (190 lb)   02/20/18 88.6 kg (195 lb 6.4 oz)   01/31/18 89.4 kg (197 lb)                    Reviewed and updated as needed this visit by clinical staff  Tobacco  Allergies  Med Hx  Surg Hx  Fam Hx  Soc Hx      Reviewed and updated as needed this visit by Provider  Tobacco  Allergies  Meds  Problems  Med Hx  Surg Hx  Fam Hx  Soc Hx          ROS:  Constitutional, HEENT, cardiovascular,  "pulmonary, gi and gu systems are negative, except as otherwise noted.    OBJECTIVE:     /80 (BP Location: Right arm, Patient Position: Chair, Cuff Size: Adult Regular)   Pulse 90   Temp 98.4  F (36.9  C) (Oral)   Ht 1.675 m (5' 5.95\")   Wt 86.2 kg (190 lb)   LMP 12/20/2018 (Exact Date)   SpO2 90%   Breastfeeding? No   BMI 30.72 kg/m    Body mass index is 30.72 kg/m .  GENERAL: healthy, alert and no distress  PSYCH: mentation appears normal, affect flat, judgement and insight intact and appearance well groomed    Diagnostic Test Results:  none     ASSESSMENT/PLAN:         BMI:   Estimated body mass index is 30.72 kg/m  as calculated from the following:    Height as of this encounter: 1.675 m (5' 5.95\").    Weight as of this encounter: 86.2 kg (190 lb).   Weight management plan: Discussed healthy diet and exercise guidelines      1. Major depressive disorder, recurrent episode, moderate (H)  Symptoms not controlled.  Wants to try lexapro twice a day - also would like to see psychiatry and psychology - follow up with us in one month  - escitalopram (LEXAPRO) 10 MG tablet; Take 1 tablet (10 mg) by mouth 2 times daily NO FURTHER REFILLS WITHOUT FOLLOW UP WITH ME  Dispense: 60 tablet; Refill: 1  - MENTAL HEALTH REFERRAL  - Adult; Outpatient Treatment; Individual/Couples/Family/Group Therapy/Health Psychology; [facility]: [facility] [phone]; We will contact you to schedule the appointment or please call with any questions  - MENTAL HEALTH REFERRAL  - Adult; Psychiatry and Medication Management; Psychiatry; [facility]: [facility] [phone].  Medication management & future refills will be returned to [facility] PCP upon completion of evaluation; We nannette...    2. Anxiety  Symptoms not controlled.  Wants to try lexapro twice a day - would like to see psychiatry and psychology    - escitalopram (LEXAPRO) 10 MG tablet; Take 1 tablet (10 mg) by mouth 2 times daily NO FURTHER REFILLS " WITHOUT FOLLOW UP WITH ME  Dispense: 60 tablet; Refill: 1  - MENTAL HEALTH REFERRAL  - Adult; Outpatient Treatment; Individual/Couples/Family/Group Therapy/Health Psychology; [facility] [phone]; We will contact you to schedule the appointment or please call with any questions  - MENTAL HEALTH REFERRAL  - Adult; Psychiatry and Medication Management; Psychiatry; [facility]: [facility] [phone].  Medication management & future refills will be returned to [facility] PCP upon completion of evaluation; We nannette...    Patient Instructions   Increase lexapro to 10 mg twice a day and follow up with psychiatry  Return urgently if any change in symptoms.    Follow up with psychologist for counseling.   Return urgently if any change in symptoms.    May try benadryl or melatonin for sleep at bedtime       Kay Alvarez PA-C  [employer]

## 2019-01-03 NOTE — PATIENT INSTRUCTIONS
Increase lexapro to 10 mg twice a day and follow up with psychiatry  Return urgently if any change in symptoms.    Follow up with psychologist for counseling.   Return urgently if any change in symptoms.    May try benadryl or melatonin for sleep at bedtime

## 2019-01-04 ASSESSMENT — ANXIETY QUESTIONNAIRES: GAD7 TOTAL SCORE: 10

## 2019-03-18 DIAGNOSIS — E55.9 VITAMIN D DEFICIENCY: ICD-10-CM

## 2019-03-18 NOTE — TELEPHONE ENCOUNTER
Requested Prescriptions   Pending Prescriptions Disp Refills     vitamin D2 (ERGOCALCIFEROL) 12284 units (1250 mcg) capsule 8 capsule 0     Sig: Take 1 capsule (50,000 Units) by mouth once a week    There is no refill protocol information for this order          vitamin D (ERGOCALCIFEROL) 15458 UNIT capsule (Discontinued)      Last Written Prescription Date:  2/7/18  Last Fill Quantity: 8,   # refills: 0  Last Office Visit: 1/3/19  Future Office visit:       Routing refill request to provider for review/approval because:  Drug not active on patient's medication list

## 2019-03-19 ENCOUNTER — DOCUMENTATION ONLY (OUTPATIENT)
Dept: FAMILY MEDICINE | Facility: CLINIC | Age: 24
End: 2019-03-19

## 2019-03-19 NOTE — TELEPHONE ENCOUNTER
Routing refill request to provider for review/approval because:  Drug not on the [facility] refill protocol   Drug not active on patient's medication list  Looks like patient is to be taking 2,000 units daily      John Varghese RN, BSN

## 2019-03-19 NOTE — PROGRESS NOTES
This patient has overdue labs. A letter was sent on 2/5/2019 and there has been no lab appointment made. If you still want these labs done, please have your care team contact the patient to make a lab appointment. Otherwise, please have the labs discontinued and close the encounter.    Thank you,  [facility]

## 2019-03-20 RX ORDER — ERGOCALCIFEROL 1.25 MG/1
50000 CAPSULE, LIQUID FILLED ORAL WEEKLY
Qty: 8 CAPSULE | Refills: 0 | OUTPATIENT
Start: 2019-03-20

## 2019-03-20 RX ORDER — CHOLECALCIFEROL (VITAMIN D3) 50 MCG
2000 TABLET ORAL DAILY
Qty: 100 TABLET | Refills: 3 | Status: SHIPPED | OUTPATIENT
Start: 2019-03-20

## 2019-03-20 NOTE — TELEPHONE ENCOUNTER
Patient should be taking 2000 units daily- prescription sent - please notify patient   Due for lipids, glucose and vitamin D levels. - please assist with scheduling

## 2019-03-27 ENCOUNTER — OFFICE VISIT (OUTPATIENT)
Dept: PSYCHOLOGY | Facility: CLINIC | Age: 24
End: 2019-03-27
Payer: COMMERCIAL

## 2019-03-27 ENCOUNTER — FCC EXTENDED DOCUMENTATION (OUTPATIENT)
Dept: PSYCHOLOGY | Facility: CLINIC | Age: 24
End: 2019-03-27

## 2019-03-27 DIAGNOSIS — F41.1 GENERALIZED ANXIETY DISORDER: ICD-10-CM

## 2019-03-27 DIAGNOSIS — F33.1 MAJOR DEPRESSIVE DISORDER, RECURRENT EPISODE, MODERATE (H): Primary | ICD-10-CM

## 2019-03-27 PROCEDURE — 90834 PSYTX W PT 45 MINUTES: CPT | Performed by: SOCIAL WORKER

## 2019-03-27 ASSESSMENT — ANXIETY QUESTIONNAIRES
GAD7 TOTAL SCORE: 19
2. NOT BEING ABLE TO STOP OR CONTROL WORRYING: NEARLY EVERY DAY
3. WORRYING TOO MUCH ABOUT DIFFERENT THINGS: NEARLY EVERY DAY
1. FEELING NERVOUS, ANXIOUS, OR ON EDGE: NEARLY EVERY DAY
IF YOU CHECKED OFF ANY PROBLEMS ON THIS QUESTIONNAIRE, HOW DIFFICULT HAVE THESE PROBLEMS MADE IT FOR YOU TO DO YOUR WORK, TAKE CARE OF THINGS AT HOME, OR GET ALONG WITH OTHER PEOPLE: VERY DIFFICULT
5. BEING SO RESTLESS THAT IT IS HARD TO SIT STILL: NEARLY EVERY DAY
7. FEELING AFRAID AS IF SOMETHING AWFUL MIGHT HAPPEN: SEVERAL DAYS
6. BECOMING EASILY ANNOYED OR IRRITABLE: NEARLY EVERY DAY

## 2019-03-27 ASSESSMENT — PATIENT HEALTH QUESTIONNAIRE - PHQ9
SUM OF ALL RESPONSES TO PHQ QUESTIONS 1-9: 21
5. POOR APPETITE OR OVEREATING: NEARLY EVERY DAY
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
## 2019-03-27 NOTE — Clinical Note
Kay Luque,Your patient presented to [facility] for a diagnostic evaluation today. We were unable to complete the full assessment today.  They will be beginning individual therapy with me and she is scheduled to return 4/1/19.Please do not hesitate to contact me if you have any questions in regards to Sarah Beth Morales's care.    Romi Douglas, [facility]

## 2019-03-27 NOTE — PATIENT INSTRUCTIONS
"                                             Sarah Beth Morales     SAFETY PLAN:  Step 1: Warning signs / cues (Thoughts, images, mood, situation, behavior) that a crisis may be developing:    Thoughts: \"I don't matter\", \"People would be better off without me\", \"I'm a burden\", \"I can't do this anymore\", \"I just want this to end\" and \"Nothing makes it better\"    Thinking Processes: racing thoughts, highly critical and negative thoughts: \"you're worthless, dumb\", disorganized thinking: feels like a mess and depersonalization    Mood: hopelessness, helplessness, intense anger, agitation and mood swings    Behaviors: impulsive, reckless behaviors (acting without thinking): buying a dog, buying things, saying good-bye, aggression, not taking care of myself, not taking care of my responsibilities, sleeping too much and increasing frequency and duration of dissociation    Situations: bullying: fighting with mother, changes in symptoms: mood swings, relationship problems, trauma  and financial stress   Step 2: Coping strategies - Things I can do to take my mind off of my problems without contacting another person (relaxation technique, physical activity):    Distress Tolerance Strategies:  play with my pet  and listen to positive and upbeat music, singing    Physical Activities: exercise: going to the gym, swimming    Focus on helpful thoughts:  \"It always passes\" and \"You're important to Corn, Bruce, and Winter\"  Step 3: People and social settings that provide distraction:   Name: Juan Phone: In phone   Name: Aunt Lore Phone: In phone   Name: Grandma Phone: In phone  Name: Best friendDhara  Phone: in phone    pet store/humane society, zoo, gym  and shopping, swimming   Step 4: Remind myself of people and things that are important to me and worth living for:    \"You're important to Corn, Bruce, and Winter\"  I don't want to break my family's heart    Step 5: When I am in crisis, I can ask these people to help me use my safety " plan:   Name: Dhara Phone: In phone   Name: Juan Phone: In phone   Name: Mom Phone: In phone  Step 6: Making the environment safe:     be around others  Step 7: Professionals or agencies I can contact during a crisis:    [facility] Daytime and After Hours Crisis Number: [phone]    Suicide Prevention Lifeline: 7-578-842-TALK (8255)    Crisis Text Line Service (available 24 hours a day, 7 days a week): Text [place] to 356599  Local Crisis Services:   [facility]: Adults, 18 and older, COPE   [phone].  [facility]: Call [phone]    Call 911 or go to my nearest emergency department.   I helped develop this safety plan and agree to use it when needed.  I have been given a copy of this plan.      Client signature _________________________________________________________________  Today s date:  3/27/2019  Adapted from Safety Plan Template 2008 Anabel Hermosillo and Josep Ricci is reprinted with the express permission of the authors.  No portion of the Safety Plan Template may be reproduced without the express, written permission.  You can contact the authors at [email or web address] or [email or web address].
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
## 2019-03-28 ASSESSMENT — ANXIETY QUESTIONNAIRES: GAD7 TOTAL SCORE: 19

## 2019-04-26 ENCOUNTER — FCC EXTENDED DOCUMENTATION (OUTPATIENT)
Dept: PSYCHOLOGY | Facility: CLINIC | Age: 24
End: 2019-04-26
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
## 2019-05-20 ENCOUNTER — TELEPHONE (OUTPATIENT)
Dept: FAMILY MEDICINE | Facility: CLINIC | Age: 24
End: 2019-05-20

## 2019-05-20 ASSESSMENT — ANXIETY QUESTIONNAIRES
2. NOT BEING ABLE TO STOP OR CONTROL WORRYING: MORE THAN HALF THE DAYS
7. FEELING AFRAID AS IF SOMETHING AWFUL MIGHT HAPPEN: NOT AT ALL
1. FEELING NERVOUS, ANXIOUS, OR ON EDGE: MORE THAN HALF THE DAYS
GAD7 TOTAL SCORE: 9
6. BECOMING EASILY ANNOYED OR IRRITABLE: NEARLY EVERY DAY
3. WORRYING TOO MUCH ABOUT DIFFERENT THINGS: MORE THAN HALF THE DAYS
IF YOU CHECKED OFF ANY PROBLEMS ON THIS QUESTIONNAIRE, HOW DIFFICULT HAVE THESE PROBLEMS MADE IT FOR YOU TO DO YOUR WORK, TAKE CARE OF THINGS AT HOME, OR GET ALONG WITH OTHER PEOPLE: SOMEWHAT DIFFICULT
5. BEING SO RESTLESS THAT IT IS HARD TO SIT STILL: NOT AT ALL

## 2019-05-20 ASSESSMENT — PATIENT HEALTH QUESTIONNAIRE - PHQ9
5. POOR APPETITE OR OVEREATING: NOT AT ALL
SUM OF ALL RESPONSES TO PHQ QUESTIONS 1-9: 8

## 2019-05-20 NOTE — TELEPHONE ENCOUNTER
Panel Management Review   One phone call and send letter if unable to reach them or [employer] message and send letter if not read after 2 weeks (You will get a message to your inbasket)      BP Readings from Last 1 Encounters:   01/03/19 118/80    , No results found for: A1C, 1/3/2019    Health Maintenance Due   Topic Date Due     HIV SCREENING  10/20/2011     PREVENTIVE CARE VISIT  01/31/2019     CHLAMYDIA SCREENING  01/31/2019        Fail List measure:     Depression / Dysthymia review    Measure:  Needs PHQ-9 score of 4 or less during index window.  Administer PHQ-9 and if score is 5 or more, send encounter to provider for next steps.    5 - 7 month window range: 5/3-9/3    PHQ-9 SCORE 1/31/2018 1/3/2019 3/27/2019   PHQ-9 Total Score 10 19 21       If PHQ-9 recheck is 5 or more, route to provider for next steps.    Patient is due for:  PHQ9        Patient is due/failing the following:   PHQ9    Action needed:   Patient needs to do PHQ9.    Type of outreach:    Phone, spoke to patient.  completed phq9    Questions for provider review:    None                                                                                       Chart routed to Provider .                                            Zohreh López, CMA

## 2019-05-20 NOTE — TELEPHONE ENCOUNTER
Patient is overdue for follow up with us.  Please assist with scheduling appointment - could be a phone visit

## 2019-05-21 ENCOUNTER — OFFICE VISIT (OUTPATIENT)
Dept: FAMILY MEDICINE | Facility: CLINIC | Age: 24
End: 2019-05-21
Payer: COMMERCIAL

## 2019-05-21 VITALS
HEART RATE: 72 BPM | RESPIRATION RATE: 16 BRPM | OXYGEN SATURATION: 98 % | HEIGHT: 66 IN | DIASTOLIC BLOOD PRESSURE: 86 MMHG | BODY MASS INDEX: 29.41 KG/M2 | TEMPERATURE: 98.4 F | SYSTOLIC BLOOD PRESSURE: 122 MMHG | WEIGHT: 183 LBS

## 2019-05-21 DIAGNOSIS — R30.0 DYSURIA: ICD-10-CM

## 2019-05-21 DIAGNOSIS — F33.1 MAJOR DEPRESSIVE DISORDER, RECURRENT EPISODE, MODERATE (H): ICD-10-CM

## 2019-05-21 DIAGNOSIS — Z12.4 SCREENING FOR MALIGNANT NEOPLASM OF CERVIX: Primary | ICD-10-CM

## 2019-05-21 DIAGNOSIS — F41.9 ANXIETY: ICD-10-CM

## 2019-05-21 DIAGNOSIS — N89.8 VAGINAL DISCHARGE: ICD-10-CM

## 2019-05-21 LAB
ALBUMIN UR-MCNC: NEGATIVE MG/DL
APPEARANCE UR: CLEAR
BILIRUB UR QL STRIP: NEGATIVE
COLOR UR AUTO: YELLOW
GLUCOSE UR STRIP-MCNC: NEGATIVE MG/DL
HGB UR QL STRIP: NEGATIVE
KETONES UR STRIP-MCNC: NEGATIVE MG/DL
LEUKOCYTE ESTERASE UR QL STRIP: NEGATIVE
NITRATE UR QL: NEGATIVE
PH UR STRIP: 7 PH (ref 5–7)
SOURCE: NORMAL
SP GR UR STRIP: 1.02 (ref 1–1.03)
SPECIMEN SOURCE: NORMAL
UROBILINOGEN UR STRIP-ACNC: 0.2 EU/DL (ref 0.2–1)
WET PREP SPEC: NORMAL

## 2019-05-21 PROCEDURE — 87210 SMEAR WET MOUNT SALINE/INK: CPT | Performed by: PHYSICIAN ASSISTANT

## 2019-05-21 PROCEDURE — 81003 URINALYSIS AUTO W/O SCOPE: CPT | Performed by: PHYSICIAN ASSISTANT

## 2019-05-21 PROCEDURE — 87591 N.GONORRHOEAE DNA AMP PROB: CPT | Performed by: PHYSICIAN ASSISTANT

## 2019-05-21 PROCEDURE — G0145 SCR C/V CYTO,THINLAYER,RESCR: HCPCS | Performed by: PHYSICIAN ASSISTANT

## 2019-05-21 PROCEDURE — 87491 CHLMYD TRACH DNA AMP PROBE: CPT | Performed by: PHYSICIAN ASSISTANT

## 2019-05-21 PROCEDURE — 99214 OFFICE O/P EST MOD 30 MIN: CPT | Performed by: PHYSICIAN ASSISTANT

## 2019-05-21 RX ORDER — SERTRALINE HYDROCHLORIDE 25 MG/1
25 TABLET, FILM COATED ORAL DAILY
Qty: 60 TABLET | Refills: 0 | Status: SHIPPED | OUTPATIENT
Start: 2019-05-21 | End: 2019-06-26 | Stop reason: DRUGHIGH

## 2019-05-21 ASSESSMENT — MIFFLIN-ST. JEOR: SCORE: 1602.86

## 2019-05-21 ASSESSMENT — ANXIETY QUESTIONNAIRES: GAD7 TOTAL SCORE: 9

## 2019-05-21 ASSESSMENT — PAIN SCALES - GENERAL: PAINLEVEL: NO PAIN (0)

## 2019-05-21 NOTE — PATIENT INSTRUCTIONS
Discontinue lexapro.   Start zoloft 25 mg daily for 1-2 weeks then 50 mg daily  Follow up with us in one month- could be a phone visit.   Return urgently if any change in symptoms, side effects or concerns  Take prenatal vitamin daily  Use over the counter monistat 7 day at bedtime for next 7 days  Follow up with us if symptoms do not improve or any change in symptoms.

## 2019-05-24 LAB
COPATH REPORT: NORMAL
PAP: NORMAL

## 2019-06-24 ENCOUNTER — TELEPHONE (OUTPATIENT)
Dept: FAMILY MEDICINE | Facility: CLINIC | Age: 24
End: 2019-06-24

## 2019-06-24 ENCOUNTER — DOCUMENTATION ONLY (OUTPATIENT)
Dept: FAMILY MEDICINE | Facility: CLINIC | Age: 24
End: 2019-06-24

## 2019-06-24 DIAGNOSIS — F41.9 ANXIETY: ICD-10-CM

## 2019-06-24 DIAGNOSIS — F33.1 MAJOR DEPRESSIVE DISORDER, RECURRENT EPISODE, MODERATE (H): ICD-10-CM

## 2019-06-24 RX ORDER — SERTRALINE HYDROCHLORIDE 25 MG/1
25 TABLET, FILM COATED ORAL DAILY
Qty: 60 TABLET | Refills: 0 | Status: CANCELLED | OUTPATIENT
Start: 2019-06-24

## 2019-06-24 NOTE — TELEPHONE ENCOUNTER
"Requested Prescriptions   Pending Prescriptions Disp Refills     sertraline (ZOLOFT) 25 MG tablet  Last Written Prescription Date:  5/21/19  Last Fill Quantity: 60 tablet,  # refills: 0   Last office visit: 5/21/2019 with prescribing provider:  Kay CA   Future Office Visit:     60 tablet 0     Sig: Take 1 tablet (25 mg) by mouth daily For one to two weeks then 2 tablets daily       SSRIs Protocol Failed - 6/24/2019 10:58 AM        Failed - PHQ-9 score less than 5 in past 6 months     Please review last PHQ-9 score.   PHQ-9 SCORE 1/3/2019 3/27/2019 5/20/2019   PHQ-9 Total Score 19 21 8             Passed - Medication is active on med list        Passed - Patient is age 18 or older        Passed - No active pregnancy on record        Passed - No positive pregnancy test in last 12 months        Passed - Recent (6 mo) or future (30 days) visit within the authorizing provider's specialty     Patient had office visit in the last 6 months or has a visit in the next 30 days with authorizing provider or within the authorizing provider's specialty.  See \"Patient Info\" tab in inbasket, or \"Choose Columns\" in Meds & Orders section of the refill encounter.              "

## 2019-06-24 NOTE — PROGRESS NOTES
This patient has overdue labs. You extended the expiration date and there has been no lab appointment made. If you still want these labs done, please have your care team contact the patient to make a lab appointment. Otherwise, please have the labs discontinued and close the encounter.    Thank you,  [facility]

## 2019-06-25 NOTE — PROGRESS NOTES
Please contact patient and encourage her to schedule fasting lab appointment to check lipids and glucose and vitamin D level

## 2019-06-26 NOTE — TELEPHONE ENCOUNTER
Routing refill request to provider for review/approval because:  Titrated dose    Julia Broussard RN

## 2019-08-15 ENCOUNTER — OFFICE VISIT (OUTPATIENT)
Dept: FAMILY MEDICINE | Facility: CLINIC | Age: 24
End: 2019-08-15
Payer: COMMERCIAL

## 2019-08-15 VITALS
RESPIRATION RATE: 16 BRPM | SYSTOLIC BLOOD PRESSURE: 118 MMHG | TEMPERATURE: 98.8 F | HEART RATE: 78 BPM | WEIGHT: 182 LBS | BODY MASS INDEX: 29.25 KG/M2 | HEIGHT: 66 IN | DIASTOLIC BLOOD PRESSURE: 76 MMHG

## 2019-08-15 DIAGNOSIS — F33.1 MAJOR DEPRESSIVE DISORDER, RECURRENT EPISODE, MODERATE (H): ICD-10-CM

## 2019-08-15 DIAGNOSIS — F41.9 ANXIETY: ICD-10-CM

## 2019-08-15 DIAGNOSIS — B37.31 YEAST VAGINITIS: Primary | ICD-10-CM

## 2019-08-15 DIAGNOSIS — N76.0 BACTERIAL VAGINOSIS: ICD-10-CM

## 2019-08-15 DIAGNOSIS — B96.89 BACTERIAL VAGINOSIS: ICD-10-CM

## 2019-08-15 DIAGNOSIS — N89.8 VAGINAL DISCHARGE: ICD-10-CM

## 2019-08-15 PROCEDURE — 87591 N.GONORRHOEAE DNA AMP PROB: CPT | Performed by: PHYSICIAN ASSISTANT

## 2019-08-15 PROCEDURE — 87491 CHLMYD TRACH DNA AMP PROBE: CPT | Performed by: PHYSICIAN ASSISTANT

## 2019-08-15 PROCEDURE — 99214 OFFICE O/P EST MOD 30 MIN: CPT | Performed by: PHYSICIAN ASSISTANT

## 2019-08-15 PROCEDURE — 87210 SMEAR WET MOUNT SALINE/INK: CPT | Performed by: PHYSICIAN ASSISTANT

## 2019-08-15 RX ORDER — SERTRALINE HYDROCHLORIDE 100 MG/1
100 TABLET, FILM COATED ORAL DAILY
Qty: 30 TABLET | Refills: 1 | Status: SHIPPED | OUTPATIENT
Start: 2019-08-15 | End: 2019-10-22

## 2019-08-15 RX ORDER — FLUCONAZOLE 150 MG/1
150 TABLET ORAL ONCE
Qty: 1 TABLET | Refills: 0 | Status: SHIPPED | OUTPATIENT
Start: 2019-08-15 | End: 2019-08-15

## 2019-08-15 ASSESSMENT — ANXIETY QUESTIONNAIRES
IF YOU CHECKED OFF ANY PROBLEMS ON THIS QUESTIONNAIRE, HOW DIFFICULT HAVE THESE PROBLEMS MADE IT FOR YOU TO DO YOUR WORK, TAKE CARE OF THINGS AT HOME, OR GET ALONG WITH OTHER PEOPLE: SOMEWHAT DIFFICULT
GAD7 TOTAL SCORE: 9
1. FEELING NERVOUS, ANXIOUS, OR ON EDGE: MORE THAN HALF THE DAYS
6. BECOMING EASILY ANNOYED OR IRRITABLE: MORE THAN HALF THE DAYS
7. FEELING AFRAID AS IF SOMETHING AWFUL MIGHT HAPPEN: SEVERAL DAYS
3. WORRYING TOO MUCH ABOUT DIFFERENT THINGS: SEVERAL DAYS
5. BEING SO RESTLESS THAT IT IS HARD TO SIT STILL: SEVERAL DAYS
2. NOT BEING ABLE TO STOP OR CONTROL WORRYING: SEVERAL DAYS

## 2019-08-15 ASSESSMENT — PAIN SCALES - GENERAL: PAINLEVEL: EXTREME PAIN (8)

## 2019-08-15 ASSESSMENT — PATIENT HEALTH QUESTIONNAIRE - PHQ9
5. POOR APPETITE OR OVEREATING: SEVERAL DAYS
SUM OF ALL RESPONSES TO PHQ QUESTIONS 1-9: 11

## 2019-08-15 ASSESSMENT — MIFFLIN-ST. JEOR: SCORE: 1598.33

## 2019-08-15 NOTE — PATIENT INSTRUCTIONS
Increase zoloft from 50 mg to 100 mg and follow up with us in one month  Take diflucan for one dose  Follow up with us in clinic next week if symptoms not improving  Return urgently if any change in symptoms.

## 2019-08-15 NOTE — PROGRESS NOTES
Subjective     Sarah Beth Morales is a 23 year old female who presents to clinic today for the following health issues:    HPI   Vaginal Symptoms  Onset: couple weeks    Description:  Vaginal Discharge: white creamy  Itching (Pruritis): YES  Burning sensation:  YES  Odor: YES    Accompanying Signs & Symptoms:  Pain with Urination: YES  Abdominal Pain: YES- cramping  Fever: no     History:   Sexually active: YES  New Partner: no   Possibility of Pregnancy:  No    Precipitating factors:   Recent Antibiotic Use: no     Alleviating factors:  none    Therapies Tried and outcome: none      PHQ-9 SCORE 3/27/2019 5/20/2019 8/15/2019   PHQ-9 Total Score 21 8 11     [facility] Follow-up to PHQ 3/27/2019 5/20/2019 8/15/2019   PHQ-9 9. Suicide Ideation past 2 weeks Several days Not at all Not at all     MIREYA-7 SCORE 3/27/2019 5/20/2019 8/15/2019   Total Score 19 9 9         No suicide plan in mind.   What ifs no plan.  What if I guess- once a month if that  Doesn't sleep well takes a while to fall asleep. 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  Super red from gain detergent but has had some vaginal discharge as well for last couple weeks.   Is sexually active monogamous relationship.  Doesn't think any risk for sexually transmitted disease.  Has had some suprapubic cramping  no reason to suspect pregnancy     Patient Active Problem List   Diagnosis     Allergic rhinitis     Presence of subdermal contraceptive device     Major depressive disorder, recurrent episode, moderate (H)     Anxiety     Elevated blood pressure reading without diagnosis of hypertension     Non morbid obesity due to excess calories     Class 1 obesity due to excess calories without serious comorbidity with body mass index (BMI) of 30.0 to 30.9 in adult     Past Surgical History:   Procedure Laterality Date     DENTAL SURGERY         Social History     Tobacco  Use     Smoking status: Never Smoker     Smokeless tobacco: Never Used   Substance Use Topics     Alcohol use: No     Comment: drinking once  a month      Family History   Problem Relation Age of Onset     Diabetes Mother      Psychotic Disorder Mother         depression     Depression Mother      Anxiety Disorder Mother      Diabetes Father      Heart Disease Maternal Grandfather         age 45 first MI      Lipids Maternal Grandfather      Hypertension Maternal Grandfather      Psychotic Disorder Maternal Grandfather         depression         Current Outpatient Medications   Medication Sig Dispense Refill     meloxicam (MOBIC) 15 MG tablet Take 1 tablet (15 mg) by mouth daily 30 tablet 1     methocarbamol (ROBAXIN) 500 MG tablet Take 2 tablets (1,000 mg) by mouth 3 times daily as needed for muscle spasms 30 tablet 1     metroNIDAZOLE (FLAGYL) 500 MG tablet Take 1 tablet (500 mg) by mouth 2 times daily for 7 days Absolutely no alcohol 14 tablet 0     sertraline (ZOLOFT) 100 MG tablet Take 1 tablet (100 mg) by mouth daily 30 tablet 1     sertraline (ZOLOFT) 50 MG tablet Take 1 tablet (50 mg) by mouth daily Due for follow up with us prior to any additional refills 30 tablet 0     vitamin D3 (CHOLECALCIFEROL) 2000 units tablet Take 1 tablet by mouth daily 100 tablet 3     BP Readings from Last 3 Encounters:   08/15/19 118/76   05/21/19 122/86   01/03/19 118/80    Wt Readings from Last 3 Encounters:   08/15/19 82.6 kg (182 lb)   05/21/19 83 kg (183 lb)   01/03/19 86.2 kg (190 lb)                      Reviewed and updated as needed this visit by Provider  Tobacco  Allergies  Meds  Problems  Med Hx  Surg Hx  Fam Hx  Soc Hx          Review of Systems   ROS COMP: Constitutional, HEENT, cardiovascular, pulmonary, gi and gu systems are negative, except as otherwise noted.      Objective    /76 (BP Location: Right arm, Patient Position: Chair, Cuff Size: Adult Regular)   Pulse 78   Temp 98.8  F (37.1  C)  "(Oral)   Resp 16   Ht 1.67 m (5' 5.75\")   Wt 82.6 kg (182 lb)   LMP 08/07/2019 (Exact Date)   Breastfeeding? No   BMI 29.60 kg/m    Body mass index is 29.6 kg/m .  Physical Exam   GENERAL: healthy, alert and no distress  NECK: no adenopathy, no asymmetry, masses, or scars and thyroid normal to palpation  RESP: lungs clear to auscultation - no rales, rhonchi or wheezes  CV: regular rate and rhythm, normal S1 S2, no S3 or S4, no murmur, click or rub, no peripheral edema and peripheral pulses strong  ABDOMEN: soft, nontender, no hepatosplenomegaly, no masses and bowel sounds normal   (female): normal female external genitalia, normal urethral meatus , vaginal mucosa pink, moist, well rugated, vaginal discharge - moderate, white and milky and normal cervix, adnexae, and uterus without masses.  MS: no gross musculoskeletal defects noted, no edema    Diagnostic Test Results:  Results for orders placed or performed in visit on 08/15/19   Wet prep   Result Value Ref Range    Specimen Description Vagina     Wet Prep No Trichomonas seen     Wet Prep WBC'S seen  Moderate       Wet Prep Many  Clue cells seen   (A)     Wet Prep Yeast seen  Moderate   (A)    NEISSERIA GONORRHOEA PCR   Result Value Ref Range    Specimen Descrip Cervical     N Gonorrhea PCR Negative NEG^Negative   CHLAMYDIA TRACHOMATIS PCR   Result Value Ref Range    Specimen Description Cervical     Chlamydia Trachomatis PCR Negative NEG^Negative           Assessment & Plan     1. Yeast vaginitis  Will treat with diflucan   - fluconazole (DIFLUCAN) 150 MG tablet; Take 1 tablet (150 mg) by mouth once for 1 dose  Dispense: 1 tablet; Refill: 0    2. Bacterial vaginosis  Results were not resulted at time of visit - notified by mychart - treat  With flagyl   - metroNIDAZOLE (FLAGYL) 500 MG tablet; Take 1 tablet (500 mg) by mouth 2 times daily for 7 days Absolutely no alcohol  Dispense: 14 tablet; Refill: 0    3. Major depressive disorder, recurrent episode, " "moderate (H)  Symptoms not at goal. Improved some with zoloft. Increase from 50 mg to 100 mg and follow up with us in one month  - sertraline (ZOLOFT) 100 MG tablet; Take 1 tablet (100 mg) by mouth daily  Dispense: 30 tablet; Refill: 1    4. Anxiety  Symptoms improved with zoloft but not at goal. Increase from 50 mg to 100 mg and follow up with us in one month   - sertraline (ZOLOFT) 100 MG tablet; Take 1 tablet (100 mg) by mouth daily  Dispense: 30 tablet; Refill: 1    5. Vaginal discharge  Negative gc and chlamydia   - Wet prep  - NEISSERIA GONORRHOEA PCR  - CHLAMYDIA TRACHOMATIS PCR     BMI:   Estimated body mass index is 29.6 kg/m  as calculated from the following:    Height as of this encounter: 1.67 m (5' 5.75\").    Weight as of this encounter: 82.6 kg (182 lb).   Weight management plan: Discussed healthy diet and exercise guidelines        Patient Instructions   Increase zoloft from 50 mg to 100 mg and follow up with us in one month  Take diflucan for one dose  Follow up with us in clinic next week if symptoms not improving  Return urgently if any change in symptoms.        Return in about 4 weeks (around 9/12/2019), or if symptoms worsen or fail to improve, for Routine Visit.    Kay Alvarez PA-C  [facility]"

## 2019-08-16 ENCOUNTER — TELEPHONE (OUTPATIENT)
Dept: FAMILY MEDICINE | Facility: CLINIC | Age: 24
End: 2019-08-16

## 2019-08-16 LAB
C TRACH DNA SPEC QL NAA+PROBE: NEGATIVE
N GONORRHOEA DNA SPEC QL NAA+PROBE: NEGATIVE
SPECIMEN SOURCE: ABNORMAL
SPECIMEN SOURCE: NORMAL
SPECIMEN SOURCE: NORMAL
WET PREP SPEC: ABNORMAL

## 2019-08-16 RX ORDER — METRONIDAZOLE 500 MG/1
500 TABLET ORAL 2 TIMES DAILY
Qty: 14 TABLET | Refills: 0 | Status: SHIPPED | OUTPATIENT
Start: 2019-08-16 | End: 2019-08-23

## 2019-08-16 ASSESSMENT — ANXIETY QUESTIONNAIRES: GAD7 TOTAL SCORE: 9

## 2019-08-16 NOTE — TELEPHONE ENCOUNTER
Deanna Burleson  You may have received a call from one of our RNs.  Your gonorrhea and chlamydia tests were negative.   The final read of your wet prep does show clue cells as well as yeast indicative of a bacterial vaginosis.   Please call us and let us know if you would like pill or vaginal cream  Both would be twice a day for 7 days  You cannot drink any alcohol with the oral medication.   Please call or MyChart my office with any questions or concerns.    Kay Alvarez, PAC    This writer attempted to contact Sarah Beth on 08/16/19      Reason for call reslts and unable to leave message. Mail box is full. [employer] message was also sent to patient.      If patient calls back:   Registered Nurse called. Follow Triage Call workflow          John Varghese RN, BSN, PHN

## 2019-08-16 NOTE — RESULT ENCOUNTER NOTE
Deanna Burleson  I have gone ahead and sent over prescription for oral flagyl 500 mg twice a day for 7 days.  Do not drink any alcohol with this as you will get vomiting and diarrhea  Let us know if you prefer the vaginal cream.  Return urgently if any change in symptoms.    Please call or MyChart my office with any questions or concerns.    Kay Alvarez, PAC

## 2019-08-16 NOTE — RESULT ENCOUNTER NOTE
Please call patient and final results of wet prep shows many clue cells as well as yeast indicative of a bacterial vaginosis.  Would she like oral flagyl (absolutely no alcohol) or vaginal cream both would be twice a day for 7 days.  Let me know pharmacy too

## 2019-08-16 NOTE — TELEPHONE ENCOUNTER
See below and advise- given time of day and fact that I am leaving I have gone ahead and sent over prescription for oral flagyl 500 mg twice a day for 7 days   Advise no alcohol

## 2019-09-27 ENCOUNTER — HEALTH MAINTENANCE LETTER (OUTPATIENT)
Age: 24
End: 2019-09-27

## 2019-10-18 ENCOUNTER — TELEPHONE (OUTPATIENT)
Dept: FAMILY MEDICINE | Facility: CLINIC | Age: 24
End: 2019-10-18

## 2019-10-18 DIAGNOSIS — F41.9 ANXIETY: ICD-10-CM

## 2019-10-18 DIAGNOSIS — F33.1 MAJOR DEPRESSIVE DISORDER, RECURRENT EPISODE, MODERATE (H): ICD-10-CM

## 2019-10-18 NOTE — TELEPHONE ENCOUNTER
"Requested Prescriptions   Pending Prescriptions Disp Refills     sertraline (ZOLOFT) 100 MG tablet  Last Written Prescription Date:  8/15/19  Last Fill Quantity: 30 tablet,  # refills: 1   Last office visit: 8/15/2019 with prescribing provider:  Kay CA   Future Office Visit:     30 tablet 1     Sig: Take 1 tablet (100 mg) by mouth daily       SSRIs Protocol Failed - 10/18/2019  9:51 AM        Failed - PHQ-9 score less than 5 in past 6 months     Please review last PHQ-9 score.   PHQ-9 SCORE 3/27/2019 5/20/2019 8/15/2019   PHQ-9 Total Score 21 8 11     MIREYA-7 SCORE 3/27/2019 5/20/2019 8/15/2019   Total Score 19 9 9                 Passed - Medication is active on med list        Passed - Patient is age 18 or older        Passed - No active pregnancy on record        Passed - No positive pregnancy test in last 12 months        Passed - Recent (6 mo) or future (30 days) visit within the authorizing provider's specialty     Patient had office visit in the last 6 months or has a visit in the next 30 days with authorizing provider or within the authorizing provider's specialty.  See \"Patient Info\" tab in inbasket, or \"Choose Columns\" in Meds & Orders section of the refill encounter.              "

## 2019-10-22 RX ORDER — SERTRALINE HYDROCHLORIDE 100 MG/1
100 TABLET, FILM COATED ORAL DAILY
Qty: 30 TABLET | Refills: 0 | Status: SHIPPED | OUTPATIENT
Start: 2019-10-22

## 2019-10-22 NOTE — TELEPHONE ENCOUNTER
Routing refill request to provider for review/approval because:  High PHQ-9 score  Dose was increased at last OV in August, needs follow up with provider.    Alma Rosa Young RN

## 2019-10-22 NOTE — TELEPHONE ENCOUNTER
Given one month- please assist with scheduling follow up with us in clinic.  No further refills without follow up with us

## 2019-10-22 NOTE — TELEPHONE ENCOUNTER
This writer attempted to contact pt on 10/22/19      Reason for call schedule OV for further refills and unable to leave message - voicemail box is full.      If patient calls back:   Schedule Office Visit appointment within 1 month with primary care, document that pt called and close encounter         Rosy Ochoa

## 2019-10-26 ENCOUNTER — HEALTH MAINTENANCE LETTER (OUTPATIENT)
Age: 24
End: 2019-10-26

## 2021-01-09 ENCOUNTER — HEALTH MAINTENANCE LETTER (OUTPATIENT)
Age: 26
End: 2021-01-09

## 2021-04-08 ENCOUNTER — TELEPHONE (OUTPATIENT)
Dept: FAMILY MEDICINE | Facility: CLINIC | Age: 26
End: 2021-04-08

## 2021-04-08 NOTE — TELEPHONE ENCOUNTER
Patient Quality Outreach      Summary:    Patient has the following on her problem list/HM:     Depression / Dysthymia review    6 Month Remission: 4-8 month window range:  12 Month Remission: 10-14 month window range:        PHQ-9 SCORE 3/27/2019 5/20/2019 8/15/2019   PHQ-9 Total Score 21 8 11       If PHQ-9 recheck is 5 or more, route to provider for next steps.    Patient is due/failing the following:   PHQ-9 Needed    Type of outreach:    Sent [employer] IMS message.    Questions for provider review:    None                                                                                                                                     Chen Winn

## 2021-10-23 ENCOUNTER — HEALTH MAINTENANCE LETTER (OUTPATIENT)
Age: 26
End: 2021-10-23

## 2022-02-12 ENCOUNTER — HEALTH MAINTENANCE LETTER (OUTPATIENT)
Age: 27
End: 2022-02-12

## 2022-10-09 ENCOUNTER — HEALTH MAINTENANCE LETTER (OUTPATIENT)
Age: 27
End: 2022-10-09

## 2023-03-25 ENCOUNTER — HEALTH MAINTENANCE LETTER (OUTPATIENT)
Age: 28
End: 2023-03-25